# Patient Record
Sex: FEMALE | Race: WHITE | NOT HISPANIC OR LATINO | URBAN - METROPOLITAN AREA
[De-identification: names, ages, dates, MRNs, and addresses within clinical notes are randomized per-mention and may not be internally consistent; named-entity substitution may affect disease eponyms.]

---

## 2017-04-29 ENCOUNTER — OUTPATIENT (OUTPATIENT)
Dept: OUTPATIENT SERVICES | Facility: HOSPITAL | Age: 77
LOS: 1 days | Discharge: HOME | End: 2017-04-29

## 2017-06-28 DIAGNOSIS — Z79.899 OTHER LONG TERM (CURRENT) DRUG THERAPY: ICD-10-CM

## 2017-06-28 DIAGNOSIS — Z01.810 ENCOUNTER FOR PREPROCEDURAL CARDIOVASCULAR EXAMINATION: ICD-10-CM

## 2017-06-28 DIAGNOSIS — E78.00 PURE HYPERCHOLESTEROLEMIA, UNSPECIFIED: ICD-10-CM

## 2017-06-28 DIAGNOSIS — I25.10 ATHEROSCLEROTIC HEART DISEASE OF NATIVE CORONARY ARTERY WITHOUT ANGINA PECTORIS: ICD-10-CM

## 2017-10-07 ENCOUNTER — OUTPATIENT (OUTPATIENT)
Dept: OUTPATIENT SERVICES | Facility: HOSPITAL | Age: 77
LOS: 1 days | Discharge: HOME | End: 2017-10-07

## 2017-10-07 DIAGNOSIS — Z79.899 OTHER LONG TERM (CURRENT) DRUG THERAPY: ICD-10-CM

## 2017-10-07 DIAGNOSIS — Z01.810 ENCOUNTER FOR PREPROCEDURAL CARDIOVASCULAR EXAMINATION: ICD-10-CM

## 2017-10-07 DIAGNOSIS — E78.00 PURE HYPERCHOLESTEROLEMIA, UNSPECIFIED: ICD-10-CM

## 2017-10-07 DIAGNOSIS — I25.10 ATHEROSCLEROTIC HEART DISEASE OF NATIVE CORONARY ARTERY WITHOUT ANGINA PECTORIS: ICD-10-CM

## 2018-04-12 ENCOUNTER — OUTPATIENT (OUTPATIENT)
Dept: OUTPATIENT SERVICES | Facility: HOSPITAL | Age: 78
LOS: 1 days | Discharge: HOME | End: 2018-04-12

## 2018-04-12 DIAGNOSIS — Z79.899 OTHER LONG TERM (CURRENT) DRUG THERAPY: ICD-10-CM

## 2018-04-12 DIAGNOSIS — I25.10 ATHEROSCLEROTIC HEART DISEASE OF NATIVE CORONARY ARTERY WITHOUT ANGINA PECTORIS: ICD-10-CM

## 2018-04-12 DIAGNOSIS — E78.00 PURE HYPERCHOLESTEROLEMIA, UNSPECIFIED: ICD-10-CM

## 2018-04-12 DIAGNOSIS — Z01.810 ENCOUNTER FOR PREPROCEDURAL CARDIOVASCULAR EXAMINATION: ICD-10-CM

## 2018-08-06 ENCOUNTER — OUTPATIENT (OUTPATIENT)
Dept: OUTPATIENT SERVICES | Facility: HOSPITAL | Age: 78
LOS: 1 days | Discharge: HOME | End: 2018-08-06

## 2018-08-06 DIAGNOSIS — E53.8 DEFICIENCY OF OTHER SPECIFIED B GROUP VITAMINS: ICD-10-CM

## 2018-08-06 DIAGNOSIS — E83.40 DISORDERS OF MAGNESIUM METABOLISM, UNSPECIFIED: ICD-10-CM

## 2018-08-06 DIAGNOSIS — E11.9 TYPE 2 DIABETES MELLITUS WITHOUT COMPLICATIONS: ICD-10-CM

## 2018-08-06 DIAGNOSIS — A69.20 LYME DISEASE, UNSPECIFIED: ICD-10-CM

## 2018-08-06 DIAGNOSIS — E55.9 VITAMIN D DEFICIENCY, UNSPECIFIED: ICD-10-CM

## 2018-08-06 DIAGNOSIS — D50.9 IRON DEFICIENCY ANEMIA, UNSPECIFIED: ICD-10-CM

## 2018-08-06 DIAGNOSIS — N39.0 URINARY TRACT INFECTION, SITE NOT SPECIFIED: ICD-10-CM

## 2018-10-16 ENCOUNTER — OUTPATIENT (OUTPATIENT)
Dept: OUTPATIENT SERVICES | Facility: HOSPITAL | Age: 78
LOS: 1 days | Discharge: HOME | End: 2018-10-16

## 2018-10-16 DIAGNOSIS — Z01.810 ENCOUNTER FOR PREPROCEDURAL CARDIOVASCULAR EXAMINATION: ICD-10-CM

## 2018-10-16 DIAGNOSIS — I25.10 ATHEROSCLEROTIC HEART DISEASE OF NATIVE CORONARY ARTERY WITHOUT ANGINA PECTORIS: ICD-10-CM

## 2018-10-16 DIAGNOSIS — Z79.899 OTHER LONG TERM (CURRENT) DRUG THERAPY: ICD-10-CM

## 2018-10-16 DIAGNOSIS — E78.00 PURE HYPERCHOLESTEROLEMIA, UNSPECIFIED: ICD-10-CM

## 2019-03-07 ENCOUNTER — OUTPATIENT (OUTPATIENT)
Dept: OUTPATIENT SERVICES | Facility: HOSPITAL | Age: 79
LOS: 1 days | Discharge: HOME | End: 2019-03-07

## 2019-03-07 DIAGNOSIS — I25.10 ATHEROSCLEROTIC HEART DISEASE OF NATIVE CORONARY ARTERY WITHOUT ANGINA PECTORIS: ICD-10-CM

## 2019-03-07 DIAGNOSIS — Z01.810 ENCOUNTER FOR PREPROCEDURAL CARDIOVASCULAR EXAMINATION: ICD-10-CM

## 2019-03-07 DIAGNOSIS — E78.00 PURE HYPERCHOLESTEROLEMIA, UNSPECIFIED: ICD-10-CM

## 2019-03-07 DIAGNOSIS — Z79.899 OTHER LONG TERM (CURRENT) DRUG THERAPY: ICD-10-CM

## 2019-03-17 PROBLEM — Z00.00 ENCOUNTER FOR PREVENTIVE HEALTH EXAMINATION: Status: ACTIVE | Noted: 2019-03-17

## 2019-09-30 ENCOUNTER — APPOINTMENT (OUTPATIENT)
Dept: CARDIOLOGY | Facility: CLINIC | Age: 79
End: 2019-09-30
Payer: MEDICARE

## 2019-09-30 PROCEDURE — 93000 ELECTROCARDIOGRAM COMPLETE: CPT

## 2019-09-30 PROCEDURE — 99214 OFFICE O/P EST MOD 30 MIN: CPT

## 2019-10-10 ENCOUNTER — APPOINTMENT (OUTPATIENT)
Dept: CARDIOLOGY | Facility: CLINIC | Age: 79
End: 2019-10-10

## 2019-12-11 ENCOUNTER — INPATIENT (INPATIENT)
Facility: HOSPITAL | Age: 79
LOS: 5 days | Discharge: HOME | End: 2019-12-17
Attending: INTERNAL MEDICINE | Admitting: INTERNAL MEDICINE
Payer: MEDICARE

## 2019-12-11 VITALS
HEART RATE: 105 BPM | OXYGEN SATURATION: 94 % | RESPIRATION RATE: 18 BRPM | TEMPERATURE: 97 F | DIASTOLIC BLOOD PRESSURE: 67 MMHG | SYSTOLIC BLOOD PRESSURE: 143 MMHG

## 2019-12-11 LAB
ALBUMIN SERPL ELPH-MCNC: 4.3 G/DL — SIGNIFICANT CHANGE UP (ref 3.5–5.2)
ALP SERPL-CCNC: 83 U/L — SIGNIFICANT CHANGE UP (ref 30–115)
ALT FLD-CCNC: 22 U/L — SIGNIFICANT CHANGE UP (ref 0–41)
ANION GAP SERPL CALC-SCNC: 15 MMOL/L — HIGH (ref 7–14)
APPEARANCE UR: CLEAR — SIGNIFICANT CHANGE UP
APTT BLD: 32.8 SEC — SIGNIFICANT CHANGE UP (ref 27–39.2)
AST SERPL-CCNC: 17 U/L — SIGNIFICANT CHANGE UP (ref 0–41)
BASE EXCESS BLDV CALC-SCNC: 4.1 MMOL/L — HIGH (ref -2–2)
BASOPHILS # BLD AUTO: 0.04 K/UL — SIGNIFICANT CHANGE UP (ref 0–0.2)
BASOPHILS NFR BLD AUTO: 0.7 % — SIGNIFICANT CHANGE UP (ref 0–1)
BILIRUB SERPL-MCNC: 0.6 MG/DL — SIGNIFICANT CHANGE UP (ref 0.2–1.2)
BILIRUB UR-MCNC: NEGATIVE — SIGNIFICANT CHANGE UP
BLD GP AB SCN SERPL QL: SIGNIFICANT CHANGE UP
BUN SERPL-MCNC: 10 MG/DL — SIGNIFICANT CHANGE UP (ref 10–20)
CA-I SERPL-SCNC: 1.26 MMOL/L — SIGNIFICANT CHANGE UP (ref 1.12–1.3)
CALCIUM SERPL-MCNC: 9.8 MG/DL — SIGNIFICANT CHANGE UP (ref 8.5–10.1)
CHLORIDE SERPL-SCNC: 100 MMOL/L — SIGNIFICANT CHANGE UP (ref 98–110)
CO2 SERPL-SCNC: 24 MMOL/L — SIGNIFICANT CHANGE UP (ref 17–32)
COLOR SPEC: SIGNIFICANT CHANGE UP
CREAT SERPL-MCNC: 0.7 MG/DL — SIGNIFICANT CHANGE UP (ref 0.7–1.5)
DIFF PNL FLD: NEGATIVE — SIGNIFICANT CHANGE UP
EOSINOPHIL # BLD AUTO: 0.02 K/UL — SIGNIFICANT CHANGE UP (ref 0–0.7)
EOSINOPHIL NFR BLD AUTO: 0.3 % — SIGNIFICANT CHANGE UP (ref 0–8)
ETHANOL SERPL-MCNC: <10 MG/DL — SIGNIFICANT CHANGE UP
GAS PNL BLDV: 139 MMOL/L — SIGNIFICANT CHANGE UP (ref 136–145)
GAS PNL BLDV: SIGNIFICANT CHANGE UP
GLUCOSE SERPL-MCNC: 125 MG/DL — HIGH (ref 70–99)
GLUCOSE UR QL: NEGATIVE — SIGNIFICANT CHANGE UP
HCO3 BLDV-SCNC: 30 MMOL/L — HIGH (ref 22–29)
HCT VFR BLD CALC: 42.5 % — SIGNIFICANT CHANGE UP (ref 37–47)
HCT VFR BLDA CALC: 46.7 % — HIGH (ref 34–44)
HGB BLD CALC-MCNC: 15.3 G/DL — SIGNIFICANT CHANGE UP (ref 14–18)
HGB BLD-MCNC: 14.7 G/DL — SIGNIFICANT CHANGE UP (ref 12–16)
IMM GRANULOCYTES NFR BLD AUTO: 0.5 % — HIGH (ref 0.1–0.3)
INR BLD: 1 RATIO — SIGNIFICANT CHANGE UP (ref 0.65–1.3)
KETONES UR-MCNC: NEGATIVE — SIGNIFICANT CHANGE UP
LACTATE BLDV-MCNC: 1.1 MMOL/L — SIGNIFICANT CHANGE UP (ref 0.5–1.6)
LEUKOCYTE ESTERASE UR-ACNC: NEGATIVE — SIGNIFICANT CHANGE UP
LYMPHOCYTES # BLD AUTO: 1.47 K/UL — SIGNIFICANT CHANGE UP (ref 1.2–3.4)
LYMPHOCYTES # BLD AUTO: 25.4 % — SIGNIFICANT CHANGE UP (ref 20.5–51.1)
MCHC RBC-ENTMCNC: 31.5 PG — HIGH (ref 27–31)
MCHC RBC-ENTMCNC: 34.6 G/DL — SIGNIFICANT CHANGE UP (ref 32–37)
MCV RBC AUTO: 91 FL — SIGNIFICANT CHANGE UP (ref 81–99)
MONOCYTES # BLD AUTO: 0.67 K/UL — HIGH (ref 0.1–0.6)
MONOCYTES NFR BLD AUTO: 11.6 % — HIGH (ref 1.7–9.3)
NEUTROPHILS # BLD AUTO: 3.55 K/UL — SIGNIFICANT CHANGE UP (ref 1.4–6.5)
NEUTROPHILS NFR BLD AUTO: 61.5 % — SIGNIFICANT CHANGE UP (ref 42.2–75.2)
NITRITE UR-MCNC: NEGATIVE — SIGNIFICANT CHANGE UP
NRBC # BLD: 0 /100 WBCS — SIGNIFICANT CHANGE UP (ref 0–0)
PCO2 BLDV: 49 MMHG — SIGNIFICANT CHANGE UP (ref 41–51)
PH BLDV: 7.4 — SIGNIFICANT CHANGE UP (ref 7.26–7.43)
PH UR: 6 — SIGNIFICANT CHANGE UP (ref 5–8)
PLATELET # BLD AUTO: 245 K/UL — SIGNIFICANT CHANGE UP (ref 130–400)
PO2 BLDV: 20 MMHG — SIGNIFICANT CHANGE UP (ref 20–40)
POTASSIUM BLDV-SCNC: 4 MMOL/L — SIGNIFICANT CHANGE UP (ref 3.3–5.6)
POTASSIUM SERPL-MCNC: 4.2 MMOL/L — SIGNIFICANT CHANGE UP (ref 3.5–5)
POTASSIUM SERPL-SCNC: 4.2 MMOL/L — SIGNIFICANT CHANGE UP (ref 3.5–5)
PROT SERPL-MCNC: 7 G/DL — SIGNIFICANT CHANGE UP (ref 6–8)
PROT UR-MCNC: NEGATIVE — SIGNIFICANT CHANGE UP
PROTHROM AB SERPL-ACNC: 11.5 SEC — SIGNIFICANT CHANGE UP (ref 9.95–12.87)
RBC # BLD: 4.67 M/UL — SIGNIFICANT CHANGE UP (ref 4.2–5.4)
RBC # FLD: 11.5 % — SIGNIFICANT CHANGE UP (ref 11.5–14.5)
SAO2 % BLDV: 36 % — SIGNIFICANT CHANGE UP
SODIUM SERPL-SCNC: 139 MMOL/L — SIGNIFICANT CHANGE UP (ref 135–146)
SP GR SPEC: 1.04 — HIGH (ref 1.01–1.02)
TROPONIN T SERPL-MCNC: <0.01 NG/ML — SIGNIFICANT CHANGE UP
UROBILINOGEN FLD QL: SIGNIFICANT CHANGE UP
WBC # BLD: 5.78 K/UL — SIGNIFICANT CHANGE UP (ref 4.8–10.8)
WBC # FLD AUTO: 5.78 K/UL — SIGNIFICANT CHANGE UP (ref 4.8–10.8)

## 2019-12-11 PROCEDURE — 70450 CT HEAD/BRAIN W/O DYE: CPT | Mod: 26

## 2019-12-11 PROCEDURE — 0042T: CPT

## 2019-12-11 PROCEDURE — 99221 1ST HOSP IP/OBS SF/LOW 40: CPT

## 2019-12-11 PROCEDURE — 71045 X-RAY EXAM CHEST 1 VIEW: CPT | Mod: 26

## 2019-12-11 PROCEDURE — 99291 CRITICAL CARE FIRST HOUR: CPT

## 2019-12-11 PROCEDURE — 93010 ELECTROCARDIOGRAM REPORT: CPT

## 2019-12-11 RX ORDER — THIAMINE MONONITRATE (VIT B1) 100 MG
100 TABLET ORAL DAILY
Refills: 0 | Status: DISCONTINUED | OUTPATIENT
Start: 2019-12-11 | End: 2019-12-17

## 2019-12-11 RX ORDER — LOSARTAN POTASSIUM 100 MG/1
50 TABLET, FILM COATED ORAL DAILY
Refills: 0 | Status: DISCONTINUED | OUTPATIENT
Start: 2019-12-11 | End: 2019-12-17

## 2019-12-11 RX ORDER — SIMVASTATIN 20 MG/1
20 TABLET, FILM COATED ORAL AT BEDTIME
Refills: 0 | Status: DISCONTINUED | OUTPATIENT
Start: 2019-12-11 | End: 2019-12-11

## 2019-12-11 RX ORDER — METOPROLOL TARTRATE 50 MG
50 TABLET ORAL DAILY
Refills: 0 | Status: DISCONTINUED | OUTPATIENT
Start: 2019-12-11 | End: 2019-12-11

## 2019-12-11 RX ORDER — ASPIRIN/CALCIUM CARB/MAGNESIUM 324 MG
325 TABLET ORAL ONCE
Refills: 0 | Status: COMPLETED | OUTPATIENT
Start: 2019-12-11 | End: 2019-12-11

## 2019-12-11 RX ORDER — ATORVASTATIN CALCIUM 80 MG/1
40 TABLET, FILM COATED ORAL AT BEDTIME
Refills: 0 | Status: DISCONTINUED | OUTPATIENT
Start: 2019-12-11 | End: 2019-12-17

## 2019-12-11 RX ORDER — CHLORHEXIDINE GLUCONATE 213 G/1000ML
1 SOLUTION TOPICAL
Refills: 0 | Status: DISCONTINUED | OUTPATIENT
Start: 2019-12-11 | End: 2019-12-17

## 2019-12-11 RX ORDER — THIAMINE MONONITRATE (VIT B1) 100 MG
6 TABLET ORAL DAILY
Refills: 0 | Status: DISCONTINUED | OUTPATIENT
Start: 2019-12-11 | End: 2019-12-11

## 2019-12-11 RX ORDER — ASPIRIN/CALCIUM CARB/MAGNESIUM 324 MG
81 TABLET ORAL DAILY
Refills: 0 | Status: DISCONTINUED | OUTPATIENT
Start: 2019-12-11 | End: 2019-12-11

## 2019-12-11 RX ORDER — ENOXAPARIN SODIUM 100 MG/ML
40 INJECTION SUBCUTANEOUS DAILY
Refills: 0 | Status: DISCONTINUED | OUTPATIENT
Start: 2019-12-11 | End: 2019-12-17

## 2019-12-11 RX ORDER — ASPIRIN/CALCIUM CARB/MAGNESIUM 324 MG
162 TABLET ORAL DAILY
Refills: 0 | Status: DISCONTINUED | OUTPATIENT
Start: 2019-12-11 | End: 2019-12-17

## 2019-12-11 RX ORDER — SODIUM CHLORIDE 9 MG/ML
1000 INJECTION INTRAMUSCULAR; INTRAVENOUS; SUBCUTANEOUS ONCE
Refills: 0 | Status: COMPLETED | OUTPATIENT
Start: 2019-12-11 | End: 2019-12-11

## 2019-12-11 RX ORDER — FOLIC ACID 0.8 MG
1 TABLET ORAL DAILY
Refills: 0 | Status: DISCONTINUED | OUTPATIENT
Start: 2019-12-11 | End: 2019-12-17

## 2019-12-11 RX ORDER — METOPROLOL TARTRATE 50 MG
50 TABLET ORAL DAILY
Refills: 0 | Status: DISCONTINUED | OUTPATIENT
Start: 2019-12-11 | End: 2019-12-17

## 2019-12-11 RX ADMIN — SODIUM CHLORIDE 1000 MILLILITER(S): 9 INJECTION INTRAMUSCULAR; INTRAVENOUS; SUBCUTANEOUS at 12:26

## 2019-12-11 RX ADMIN — Medication 1 MILLIGRAM(S): at 21:49

## 2019-12-11 RX ADMIN — Medication 1 MILLIGRAM(S): at 21:00

## 2019-12-11 NOTE — H&P ADULT - NSHPSOCIALHISTORY_GEN_ALL_CORE
Former smoker (quit 35 years ago), 25 pack years  Current drinker: About 2 12oz cans of beer/day   Never drugs  Not sexually active  Retired, lives alone  Golfs/plays tennis about 2-4 times per week

## 2019-12-11 NOTE — CONSULT NOTE ADULT - ASSESSMENT
Impression:  79 year old woman with a PMH of HTN DLD and prior transient global amnesia, otherwise healthy presenting with AMS. Per family, last normal last night approx 830pm the previous evening, this morning was found to be altered, having difficulty with word finding. She is known to have a recent URI. Stroke code called on arrival to ED. CTH failed to reveal acute intracranial pathology. CTA CTP failed to reveal an LVO or significant perfusion abnormality.  Etiology of symptoms unknown will have a better understanding post stroke workup.     Suggestion:  Routine stroke workup including:  MRI brain without óscar.  TTE.  LDL, A1C  Telemetry monitoring.   PT OT Rehab  Infectious workup.  Medical workup for encephalopathy.  Routine EEG.     Would start ASA and statin.    Disposition:  Stroke unit    Miles Lauren NP  [id number]

## 2019-12-11 NOTE — ED PROVIDER NOTE - NS ED ROS FT
Limited ROS 2/2 patient's presenting illness. Patient unable to complete full sentences. When asked, patient denies chest pain, SOB, abdominal pain, back pain. Otherwise ROS limited. Limited ROS 2/2 patient's presenting symptoms. Patient unable to complete full sentences.   CV: Denies chest pain, palpitations  PULM: Denies SOB, cough  GI: Denies nausea, vomiting, diarrhea  NEURO: Denies headache

## 2019-12-11 NOTE — ED ADULT NURSE NOTE - OBJECTIVE STATEMENT
Patient arrived from home via EMS after family stated they noted her to have AMS. Normally patient is alert and able to have a conversation but patient is currently slow to respond. Last known well was 8PM last night .

## 2019-12-11 NOTE — CONSULT NOTE ADULT - ATTENDING COMMENTS
Patient seen and examined with NP during stroke code.  Patient was last spoken to normal at 8PM the night before.  She had CTH which was unremarkable and CTP which was unremarkable.  Daughter says she has gotten like this with UTI's in past.    Plan as above

## 2019-12-11 NOTE — ED PROVIDER NOTE - CLINICAL SUMMARY MEDICAL DECISION MAKING FREE TEXT BOX
78yo F history of prior transient global amnesia, otherwise healthy presenting with AMS. Per family, last normal last night approx 830pm, this morning was found to be altered, not following commands, having difficulty with word finding. +recent URI sx, no other complaints. stroke code called. labs ekg imaging reviewed. dw neuro 78yo F history of prior transient global amnesia, otherwise healthy presenting with AMS. Per family, last normal last night approx 830pm, this morning was found to be altered, not following commands, having difficulty with word finding. +recent URI sx, no other complaints. stroke code called. labs ekg imaging reviewed. grace neuro, will admit

## 2019-12-11 NOTE — H&P ADULT - ATTENDING COMMENTS
Patient seen and evaluated in the ED. Agree with the resident as above except as noted in this section.    Encephalopathy with expressive aphasia   r/o CVA     care as per stroke unit  Neurochecks q4 hours  Monitor BP   f/u REEG  Repeat CTH   will need MRI   IVF  ECHO/bubble study   PT/rehab   SLP   DVT ppx   Decub prevention as per RN protocol

## 2019-12-11 NOTE — ED PROVIDER NOTE - OBJECTIVE STATEMENT
79 year old female, pmh brief global amnesia in past, who presents with altered mental status. Family spoke with patient last night at 8pm, patient was at her baseline, speaking full sentences on phone, at that time patient stated she "didn't feel well." Family did not hear from patient this morning, went to her home where she was found standing in her kitchen not responding to external stimuli. Patient recently with flu-like symptoms last week. 79 year old female, pmh brief global amnesia in past, HTN, HDL, who presents with altered mental status. Family spoke with patient last night at 8pm, patient was at her baseline, speaking full sentences on phone, at that time patient stated she "didn't feel well." Family did not hear from patient this morning, went to her home where she was found standing in her kitchen not responding to external stimuli. Patient recently with flu-like symptoms last week. No fever. 79 year old female, pmh HTN, HDL, brief global amnesia in past, who presents with altered mental status. Family spoke with patient last night at 8pm, states she was at her baseline, speaking full sentences on phone, at that time patient stated she "didn't feel well." Family did not hear from patient this morning, went to her home where she was found standing in her kitchen not responding to external stimuli, unknown if patient had fall, trauma. Patient recently with flu-like symptoms last week. No fever, cough, vomiting, diarrhea.

## 2019-12-11 NOTE — ED ADULT NURSE REASSESSMENT NOTE - NS ED NURSE REASSESS COMMENT FT1
PT went to MRI, tech called and stated that pt was increasingly agitated and will not be able to perform the test. MD [phone] notified and ativan 1mg was order and given. PT remained agitated and test was unable to be performed.

## 2019-12-11 NOTE — CHART NOTE - NSCHARTNOTEFT_GEN_A_CORE
Patient was taken for MRI. Patient was unable to remain still for MRI, was deemed unsafe. Stat dose of IV ativan was ordered, did not improve patient status. Will re-assess patient for MRI

## 2019-12-11 NOTE — H&P ADULT - NSHPLABSRESULTS_GEN_ALL_CORE
14.7   5.78  )-----------( 245      ( 11 Dec 2019 11:07 )             42.5         139  |  100  |  10  ----------------------------<  125<H>  4.2   |  24  |  0.7    Ca    9.8      11 Dec 2019 11:07    TPro  7.0  /  Alb  4.3  /  TBili  0.6  /  DBili  x   /  AST  17  /  ALT  22  /  AlkPhos  83  12-    Urinalysis Basic - ( 11 Dec 2019 11:07 )    Color: Light Yellow / Appearance: Clear / S.042 / pH: x  Gluc: x / Ketone: Negative  / Bili: Negative / Urobili: <2 mg/dL   Blood: x / Protein: Negative / Nitrite: Negative   Leuk Esterase: Negative / RBC: x / WBC x   Sq Epi: x / Non Sq Epi: x / Bacteria: x      PT/INR - ( 11 Dec 2019 11:07 )   PT: 11.50 sec;   INR: 1.00 ratio         PTT - ( 11 Dec 2019 11:07 )  PTT:32.8 sec    < from: CT Brain Stroke Protocol (19 @ 11:30) >    IMPRESSION:     1.  No evidence of acute intracranial hemorrhage or large territory   infarct.    2.  Moderate chronic microvascular changes.    < end of copied text >    < from: Xray Chest 1 View- PORTABLE-Urgent (19 @ 13:08) >      Impression:      No radiographic evidence of acute cardiopulmonary disease.    < end of copied text >    < from: CT Perfusion w/ Maps w/ IV Cont (19 @ 13:10) >      IMPRESSION:    1.  No evidence of large vessel occlusion. No definite perfusion   abnormality.    2.  Calcific plaque at the right ICA origin with about 50% stenosis.    3.  Motion artifact somewhat limits evaluation of the intracranial   circulation.    4.  Bilateral palatine tonsillar prominence, correlate clinically for   tonsillitis.      < end of copied text >

## 2019-12-11 NOTE — ED PROVIDER NOTE - ATTENDING CONTRIBUTION TO CARE
78yo F history of prior transient global amnesia, otherwise healthy presenting with AMS. Per family, last normal last night approx 830pm, this morning was found to be altered, not following commands, having difficulty with word finding. +recent URI sx, no other complaints.   Constitutional:  Non toxic.   Eyes: PERRLA. Extraocular movements intact, no entrapment. Conjunctiva normal.   ENT: No nasal discharge. Moist mucus membranes.  Neck: Supple, non tender, full range of motion.  CV: RRR no murmurs, rubs, or gallops. +S1S2.   Pulm: Clear to auscultation bilaterally. Normal work of breathing.  Abd: soft NT ND +BS.   Ext: Warm and well perfused x4, moving all extremities, no edema.   Psy: Cooperative, appropriate.   Skin: Warm, dry, no rash  Neuro: unable to follow commands

## 2019-12-11 NOTE — H&P ADULT - NSHPPHYSICALEXAM_GEN_ALL_CORE
PHYSICAL EXAM:  GENERAL: Tremulous, lying in bed  HEAD:  Atraumatic, Normocephalic  EYES: EOMI, PERRLA, conjunctiva and sclera clear  ENT: Moist mucous membranes  NECK: Supple, No JVD  CHEST/LUNG: Clear to auscultation bilaterally; No rales, rhonchi, wheezing, or rubs. Unlabored respirations  HEART: Regular rate and rhythm; No murmurs, rubs, or gallops  ABDOMEN: Bowel sounds present; Soft, Nontender, Nondistended. No hepatomegaly  EXTREMITIES:  2+ Peripheral Pulses, brisk capillary refill. No clubbing, cyanosis, or edema  NERVOUS SYSTEM: Patient has tremors; Alert & Oriented X1 (knows family member's names); expressive aphasia, dysarthric; Unable to assess cranial nerves, as patient is unable to follow commands; responsive to pain in b/l upper and lower extremities; able to keep both arms and legs up for around 5 seconds   MSK: FROM all 4 extremities, 3/5 upper and lower extremities b/l  SKIN: No rashes or lesions

## 2019-12-11 NOTE — H&P ADULT - ASSESSMENT
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 Stroke code was called, NIHSS 7, no TPA given, imaging so far negative.     #Possible stroke- patient with altered mental status, NIHSS 7, CT head/perfusion studies negative so far  -admit to stroke unit  -MRI brain without óscar.  -TTE.  -LDL, A1C  -Telemetry monitoring.   -PT OT Rehab  -Routine EEG.     #HTN  -Losartan and Metoprolol tartrate    #HLD  -Simvastatin    #Possible thiamine and folate deficiency  - Supplement    #Code  - Full    #Diet  - NPO pending speech/swallow eval    #DVT PPx  - SCD    #Dispo  - From home, lives by herself, possible need social eval 79 Y F with pmh of transient global ischemia (8-9 years ago), HTN, DLD presents to ED after being found by sister and daughter this morning with altered mental status. Stroke code was called, NIHSS 7, no TPA given, imaging so far negative.     #Possible stroke- patient with altered mental status, NIHSS 7, CT head/perfusion studies negative so far  -admit to stroke unit  -MRI brain without óscar.  -TTE.  -LDL, A1C  -Telemetry monitoring.   -PT OT Rehab  -Routine EEG.   - asa and statin    #HTN  -Losartan and Metoprolol tartrate      #Possible thiamine and folate deficiency  - due to prolonged EtOH use  - Supplement    #Code  - Full    #Diet  - NPO pending speech/swallow eval    #DVT PPx  - SCD    #Dispo  - From home, lives by herself, possible need social eval

## 2019-12-11 NOTE — H&P ADULT - HISTORY OF PRESENT ILLNESS
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 Per family, patient was complaining of "not feeling right" the last week with URI symptoms (cough, fever) and went to PMD earlier this week, was sent home with presumed diagnosis of flu (not given any Tamiflu). She was saying that she was so tired throughout the week, had lost around 8 lbs this week, and last night patient told her daughter that she was "feeling so tired" before she went to bed around 8:30PM. Her family went to her house around 9AM and found patient standing in kitchen, confused, not able to answer questions, couldn't find words. Per her daughter, a similar episode happened around 8 or 9 years ago where she was confused and not answering questions, went to ED and was diagnosed with dehydration and UTI. Per daughter, patient spends a lot of time golfing near woods.   ED Course: Stroke code was called, NIHSS was 7 but no TPA given due to patient being outside of window period. CTH failed to reveal acute intracranial pathology. CTA CTP failed to reveal an LVO or significant perfusion abnormality. Vitals signs T 97 F, /67, P 105, R 18, S 94% RA.

## 2019-12-11 NOTE — CONSULT NOTE ADULT - SUBJECTIVE AND OBJECTIVE BOX
MARTIR POWELL    Chief Complaint: Aphasia    HPI:  79 year old woman with a PMH of HTN DLD and prior transient global amnesia, otherwise healthy presenting with AMS. Per family, last normal last night approx 830pm the previous evening, this morning was found to be altered, having difficulty with word finding. She is known to have a recent URI. Stroke code called on arrival to ED. CTH failed to reveal acute intracranial pathology. CTA CTP failed to reveal an LVO or significant perfusion abnormality.      Relevant PMH:  [] Prior ischemic stroke/TIA  [] Afib  []CAD  [x]HTN  [x]DLD  []DM []PVD []Obesity [] Sedintary lifestyle []CHF  []LYNNE  []Cancer Hx     Social History: [] Smoking []  Drug Use: []   Alcohol Use:   [] Other:      Possible Location of Stroke:  Unknown at this time, will have a better understanding post stroke workup.  Possible Cause of Stroke:  Unknown at this time, will have a better understanding post stroke workup.  Relevant Cerebral Imaging:  < from: CT Brain Stroke Protocol (12.11.19 @ 11:30) >    IMPRESSION:     1.  No evidence of acute intracranial hemorrhage or large territory   infarct.    2.  Moderate chronic microvascular changes.    Relevant Cervicocerebral Imaging:    Pending      Relevant blood tests:    pending  Relevant cardiac rhythm monitoring:  pending  Relevant Cardiac Structure:(TTE/CIRA +/-):[]No intracardiac thrombus/[] no vegetation/[]no akynesia/EF:  pending    Home Medications:  aspirin 81 mg oral tablet, chewable:  (11 Dec 2019 11:29)  folic acid:  (11 Dec 2019 11:29)  irbesartan 150 mg oral tablet:  (11 Dec 2019 11:29)  Metoprolol Tartrate 50 mg oral tablet:  (11 Dec 2019 11:29)  pravastatin 40 mg oral tablet:  (11 Dec 2019 11:29)      MEDICATIONS  (STANDING):      PT/OT/Speech/Rehab/S&Swr: pending    Exam:    Vital Signs Last 24 Hrs  T(C): 35.7 (11 Dec 2019 11:50), Max: 36.1 (11 Dec 2019 10:57)  T(F): 96.3 (11 Dec 2019 11:50), Max: 97 (11 Dec 2019 10:57)  HR: 97 (11 Dec 2019 11:50) (97 - 105)  BP: 131/66 (11 Dec 2019 11:50) (131/66 - 143/67)  BP(mean): --  RR: 18 (11 Dec 2019 10:57) (18 - 18)  SpO2: 94% (11 Dec 2019 10:57) (94% - 94%)    NIHSS      LOC:       1a:  0   1b(Questions): 2          1c(Instructions):2             Best Gaze:0  Visual:0  Motor:                 RUE:  0   RLE:  0   LUE:   0  LLE:0     FACE:  0   Limb Ataxia:0  Sensory:     0  Language:    2   Dysarthria:     1     Extinction and Inattention:0       NIHSS today:  7           m-RS:3

## 2019-12-11 NOTE — ED ADULT NURSE NOTE - PMH
High cholesterol    HTN (hypertension) High cholesterol    HTN (hypertension)    Transient global amnesia

## 2019-12-11 NOTE — ED PROVIDER NOTE - PHYSICAL EXAMINATION
CONST: Well appearing in NAD  EYES: PERRL, EOMI, Sclera and conjunctiva clear.   ENT:  Oropharynx normal appearing, no erythema or exudates. No abscess or swelling. Uvula midline.   NECK: Non-tender, normal ROM  CARD: Normal S1 S2; Normal rate and rhythm  RESP: Equal BS B/L, No wheezes, rhonchi or rales. No distress  GI: Soft, non-tender, non-distended.   MS: Normal ROM in all extremities. No midline spinal tenderness.   SKIN: Warm, dry, no acute rashes. Good turgor  NEURO: A&OX3, intermittent eye tracking. patient not following commands. CONST: Well appearing in NAD  EYES: PERRL, EOMI, Sclera and conjunctiva clear.   ENT:  Oropharynx normal appearing, no erythema or exudates. No abscess or swelling. Uvula midline. No periorbital or mastoid tenderness or ecchymosis. No evidence of basal skull fx. No hemotympanum. No nasal discharge.  NECK: Non-tender, normal ROM  CARD: Normal S1 S2; Normal rate and rhythm  RESP: Equal BS B/L, No wheezes, rhonchi or rales. No respiratory distress.  GI: Soft, non-tender, non-distended.   MS: Normal ROM in all extremities. No midline spinal tenderness. Pulses 2+ bilaterally.  SKIN: Warm, dry, intact. No evidence of rash.  NEURO: A&O1, patient with eyes open, intermittent eye tracking, does not respond to questions and does not follow commands. CN II-XII unable to be assessed.

## 2019-12-12 LAB
ALBUMIN SERPL ELPH-MCNC: 4.2 G/DL — SIGNIFICANT CHANGE UP (ref 3.5–5.2)
ALP SERPL-CCNC: 84 U/L — SIGNIFICANT CHANGE UP (ref 30–115)
ALT FLD-CCNC: 20 U/L — SIGNIFICANT CHANGE UP (ref 0–41)
AMMONIA BLD-MCNC: 16 UMOL/L — SIGNIFICANT CHANGE UP (ref 11–55)
ANION GAP SERPL CALC-SCNC: 16 MMOL/L — HIGH (ref 7–14)
AST SERPL-CCNC: 20 U/L — SIGNIFICANT CHANGE UP (ref 0–41)
BASOPHILS # BLD AUTO: 0.05 K/UL — SIGNIFICANT CHANGE UP (ref 0–0.2)
BASOPHILS NFR BLD AUTO: 0.7 % — SIGNIFICANT CHANGE UP (ref 0–1)
BILIRUB SERPL-MCNC: 0.6 MG/DL — SIGNIFICANT CHANGE UP (ref 0.2–1.2)
BUN SERPL-MCNC: 11 MG/DL — SIGNIFICANT CHANGE UP (ref 10–20)
CALCIUM SERPL-MCNC: 9.5 MG/DL — SIGNIFICANT CHANGE UP (ref 8.5–10.1)
CHLORIDE SERPL-SCNC: 102 MMOL/L — SIGNIFICANT CHANGE UP (ref 98–110)
CHOLEST SERPL-MCNC: 166 MG/DL — SIGNIFICANT CHANGE UP (ref 100–200)
CO2 SERPL-SCNC: 23 MMOL/L — SIGNIFICANT CHANGE UP (ref 17–32)
CREAT SERPL-MCNC: 0.5 MG/DL — LOW (ref 0.7–1.5)
EOSINOPHIL # BLD AUTO: 0.02 K/UL — SIGNIFICANT CHANGE UP (ref 0–0.7)
EOSINOPHIL NFR BLD AUTO: 0.3 % — SIGNIFICANT CHANGE UP (ref 0–8)
ESTIMATED AVERAGE GLUCOSE: 111 MG/DL — SIGNIFICANT CHANGE UP (ref 68–114)
GLUCOSE SERPL-MCNC: 118 MG/DL — HIGH (ref 70–99)
HBA1C BLD-MCNC: 5.5 % — SIGNIFICANT CHANGE UP (ref 4–5.6)
HCT VFR BLD CALC: 42 % — SIGNIFICANT CHANGE UP (ref 37–47)
HDLC SERPL-MCNC: 68 MG/DL — SIGNIFICANT CHANGE UP
HGB BLD-MCNC: 14.2 G/DL — SIGNIFICANT CHANGE UP (ref 12–16)
IMM GRANULOCYTES NFR BLD AUTO: 0.5 % — HIGH (ref 0.1–0.3)
LIPID PNL WITH DIRECT LDL SERPL: 89 MG/DL — SIGNIFICANT CHANGE UP (ref 4–129)
LYMPHOCYTES # BLD AUTO: 1.6 K/UL — SIGNIFICANT CHANGE UP (ref 1.2–3.4)
LYMPHOCYTES # BLD AUTO: 20.9 % — SIGNIFICANT CHANGE UP (ref 20.5–51.1)
MAGNESIUM SERPL-MCNC: 1.8 MG/DL — SIGNIFICANT CHANGE UP (ref 1.8–2.4)
MCHC RBC-ENTMCNC: 30.8 PG — SIGNIFICANT CHANGE UP (ref 27–31)
MCHC RBC-ENTMCNC: 33.8 G/DL — SIGNIFICANT CHANGE UP (ref 32–37)
MCV RBC AUTO: 91.1 FL — SIGNIFICANT CHANGE UP (ref 81–99)
MONOCYTES # BLD AUTO: 0.87 K/UL — HIGH (ref 0.1–0.6)
MONOCYTES NFR BLD AUTO: 11.3 % — HIGH (ref 1.7–9.3)
NEUTROPHILS # BLD AUTO: 5.09 K/UL — SIGNIFICANT CHANGE UP (ref 1.4–6.5)
NEUTROPHILS NFR BLD AUTO: 66.3 % — SIGNIFICANT CHANGE UP (ref 42.2–75.2)
NRBC # BLD: 0 /100 WBCS — SIGNIFICANT CHANGE UP (ref 0–0)
PHOSPHATE SERPL-MCNC: 3 MG/DL — SIGNIFICANT CHANGE UP (ref 2.1–4.9)
PLATELET # BLD AUTO: 271 K/UL — SIGNIFICANT CHANGE UP (ref 130–400)
POTASSIUM SERPL-MCNC: 4 MMOL/L — SIGNIFICANT CHANGE UP (ref 3.5–5)
POTASSIUM SERPL-SCNC: 4 MMOL/L — SIGNIFICANT CHANGE UP (ref 3.5–5)
PROT SERPL-MCNC: 7 G/DL — SIGNIFICANT CHANGE UP (ref 6–8)
RBC # BLD: 4.61 M/UL — SIGNIFICANT CHANGE UP (ref 4.2–5.4)
RBC # FLD: 11.4 % — LOW (ref 11.5–14.5)
SODIUM SERPL-SCNC: 141 MMOL/L — SIGNIFICANT CHANGE UP (ref 135–146)
TOTAL CHOLESTEROL/HDL RATIO MEASUREMENT: 2.4 RATIO — LOW (ref 4–5.5)
TRIGL SERPL-MCNC: 75 MG/DL — SIGNIFICANT CHANGE UP (ref 10–149)
WBC # BLD: 7.67 K/UL — SIGNIFICANT CHANGE UP (ref 4.8–10.8)
WBC # FLD AUTO: 7.67 K/UL — SIGNIFICANT CHANGE UP (ref 4.8–10.8)

## 2019-12-12 PROCEDURE — 93306 TTE W/DOPPLER COMPLETE: CPT | Mod: 26

## 2019-12-12 PROCEDURE — 99222 1ST HOSP IP/OBS MODERATE 55: CPT

## 2019-12-12 PROCEDURE — 70450 CT HEAD/BRAIN W/O DYE: CPT | Mod: 26

## 2019-12-12 RX ORDER — METOPROLOL TARTRATE 50 MG
5 TABLET ORAL ONCE
Refills: 0 | Status: COMPLETED | OUTPATIENT
Start: 2019-12-12 | End: 2019-12-12

## 2019-12-12 RX ADMIN — Medication 1 MILLIGRAM(S): at 13:19

## 2019-12-12 RX ADMIN — Medication 5 MILLIGRAM(S): at 07:06

## 2019-12-12 RX ADMIN — ATORVASTATIN CALCIUM 40 MILLIGRAM(S): 80 TABLET, FILM COATED ORAL at 23:17

## 2019-12-12 RX ADMIN — Medication 100 MILLIGRAM(S): at 13:20

## 2019-12-12 RX ADMIN — Medication 162 MILLIGRAM(S): at 13:19

## 2019-12-12 RX ADMIN — ENOXAPARIN SODIUM 40 MILLIGRAM(S): 100 INJECTION SUBCUTANEOUS at 13:20

## 2019-12-12 NOTE — SWALLOW BEDSIDE ASSESSMENT ADULT - SWALLOW EVAL: DIAGNOSIS
+toleration for thin liquids, puree and soft solids w/o overt s/s penetration/aspiration. mod oral dysphagia for hard solids w/o overt s/s penetration/aspiration.

## 2019-12-12 NOTE — SWALLOW BEDSIDE ASSESSMENT ADULT - SWALLOW EVAL: FUNCTIONAL LEVEL AT TIME OF EVAL
alert, awake, confused and nervous/trying to get out of bed, at times. required encouragement to take breaths and relax- pt responsive.

## 2019-12-12 NOTE — SWALLOW BEDSIDE ASSESSMENT ADULT - SLP GENERAL OBSERVATIONS
Pt received laying in bed, repositioned upright for evaluation. +room air. Pt alert and oriented to self only, difficulties noted w/ expressive language- further speech language assessment warranted. Pt's son and daughter at the bedside. No dysarthria observed.

## 2019-12-12 NOTE — PROGRESS NOTE ADULT - ASSESSMENT
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 Stroke code was called, NIHSS 7, no TPA given, imaging so far negative.     # AMS - NIHSS 7. Rule out stroke vs. wernicke encephalopathy vs. other  - CT head/perfusion studies negative so far  - admit to stroke unit  - f/u MRI brain without óscar. (unable to perform because patient was agitated. Re-ordered)  - f/u TTE.  - f/u LDL, A1C  - PT OT Rehab once patient is more stable  - f/u Routine EEG.   - Continue asa and statin  - Neuro f/u    # HTN  - Losartan and Metoprolol tartrate    # Possible thiamine and folate deficiency secondary to EtOH abuse  - CIWA protocol PRN  - due to prolonged EtOH use  - Supplement    Diet - NPO pending speech/swallow eval  DVT PPx - SCD  GI ppx - not indicated  Dispo - From home, lives by herself, possible need social eval   FULL CODE

## 2019-12-12 NOTE — PROGRESS NOTE ADULT - SUBJECTIVE AND OBJECTIVE BOX
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 Per family, patient was complaining of "not feeling right" the last week with URI symptoms (cough, fever) and went to PMD earlier this week, was sent home with presumed diagnosis of flu (not given any Tamiflu). She was saying that she was so tired throughout the week, had lost around 8 lbs this week, and last night patient told her daughter that she was "feeling so tired" before she went to bed around 8:30PM. Her family went to her house around 9AM and found patient standing in kitchen, confused, not able to answer questions, couldn't find words. Per her daughter, a similar episode happened around 8 or 9 years ago where she was confused and not answering questions, went to ED and was diagnosed with dehydration and UTI. Per daughter, patient spends a lot of time golfing near woods.   ED Course: Stroke code was called, NIHSS was 7 but no TPA given due to patient being outside of window period. CTH failed to reveal acute intracranial pathology. CTA CTP failed to reveal an LVO or significant perfusion abnormality. Vitals signs T 97 F, /67, P 105, R 18, S 94% RA. (11 Dec 2019 13:48)      Interval History - Patient seen this morning and she appears to be calmer then yesterday but still has difficulty expressing herself.  No events overnight          Vital Signs Last 24 Hrs  T(C): 36.4 (12 Dec 2019 08:20), Max: 37.3 (11 Dec 2019 17:05)  T(F): 97.6 (12 Dec 2019 08:20), Max: 99.1 (11 Dec 2019 17:05)  HR: 87 (12 Dec 2019 08:20) (87 - 105)  BP: 177/85 (12 Dec 2019 08:20) (131/66 - 177/85)  BP(mean): --  RR: 18 (12 Dec 2019 08:20) (18 - 20)  SpO2: 98% (12 Dec 2019 08:20) (94% - 100%)  ICU Vital Signs Last 24 Hrs  T(C): 36.4 (12 Dec 2019 08:20), Max: 37.3 (11 Dec 2019 17:05)  T(F): 97.6 (12 Dec 2019 08:20), Max: 99.1 (11 Dec 2019 17:05)  HR: 87 (12 Dec 2019 08:20) (87 - 105)  BP: 177/85 (12 Dec 2019 08:20) (131/66 - 177/85)  BP(mean): --  ABP: --  ABP(mean): --  RR: 18 (12 Dec 2019 08:20) (18 - 20)  SpO2: 98% (12 Dec 2019 08:20) (94% - 100%)          Neurological Exam:   alert oriented to person but has difficulty speaking.  Was able to read Mama but not any other words.  Could not repeat.  Was able to follow simple commands.  No facial and VFF to threat  No drift in UE and LE  FTN normal    NIHSS 3    Medications  aspirin enteric coated 162 milliGRAM(s) Oral daily  atorvastatin 40 milliGRAM(s) Oral at bedtime  chlorhexidine 4% Liquid 1 Application(s) Topical <User Schedule>  enoxaparin Injectable 40 milliGRAM(s) SubCutaneous daily  folic acid 1 milliGRAM(s) Oral daily  LORazepam   Injectable 2 milliGRAM(s) IV Push once  LORazepam   Injectable 1 milliGRAM(s) IV Push every 2 hours PRN  losartan 50 milliGRAM(s) Oral daily  metoprolol succinate ER 50 milliGRAM(s) Oral daily  thiamine 100 milliGRAM(s) Oral daily      Lab                        14.2   7.67  )-----------( 271      ( 12 Dec 2019 07:46 )             42.0     12-12    141  |  102  |  11  ----------------------------<  118<H>  4.0   |  23  |  0.5<L>    Ca    9.5      12 Dec 2019 07:46  Phos  3.0     12-12  Mg     1.8     12-12    TPro  7.0  /  Alb  4.2  /  TBili  0.6  /  DBili  x   /  AST  20  /  ALT  20  /  AlkPhos  84  12-12    CAPILLARY BLOOD GLUCOSE  125 (11 Dec 2019 12:47)      POCT Blood Glucose.: 108 mg/dL (11 Dec 2019 11:08)    LIVER FUNCTIONS - ( 12 Dec 2019 07:46 )  Alb: 4.2 g/dL / Pro: 7.0 g/dL / ALK PHOS: 84 U/L / ALT: 20 U/L / AST: 20 U/L / GGT: x           PT/INR - ( 11 Dec 2019 11:07 )   PT: 11.50 sec;   INR: 1.00 ratio         PTT - ( 11 Dec 2019 11:07 )  PTT:32.8 sec    Urinalysis (12.11.19 @ 11:07)    pH Urine: 6.0    Glucose Qualitative, Urine: Negative    Blood, Urine: Negative    Color: Light Yellow    Urine Appearance: Clear    Bilirubin: Negative    Ketone - Urine: Negative    Specific Gravity: 1.042    Protein, Urine: Negative    Urobilinogen: <2 mg/dL    Nitrite: Negative    Leukocyte Esterase Concentration: Negative        Radiology  < from: CT Perfusion w/ Maps w/ IV Cont (12.11.19 @ 13:10) >    EXAM:  CT PERFUSION W MAPS IC            PROCEDURE DATE:  12/11/2019            INTERPRETATION:  Clinical History / Reason for exam: Stroke code. Aphasia    Technique: CT angiogram of the head and neck including perfusion study of   the brain.  Contiguous CT axial images of the head and neck were obtained   following the bolus intravenous administration of 120 cc Optiray with   multiple 3-D and MIP reformats with perfusion mapping performed on a   separate 3D workstation ([employer]).    Correlation made with accompanying noncontrast head CT.    Findings:    CTA neck:   The visualized aortic arch and great vessel origins demonstrate calcific   plaque without significant stenosis. 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    The right common carotid artery is patent. There is calcific plaque at   the right ICA origin with about 50% stenosis. The ECA origin is patent.   Remaining cervical right ICA is patent.. Slightly medial course of the   right internal carotid artery indents the posterior pharyngeal wall.    The left common, internal and external carotid arteries are patent.    The vertebral arteries are patent.    CTA brain: There is motion artifact at the level of thehead. The distal   segments of the internal carotid arteries are grossly patent.  The   anterior and middle cerebral arteries are grossly patent.    The distal vertebral arteries are grossly patent. The basilar artery is   patent.  The PCAs are patent.     Perfusion: There is no definite perfusion abnormality. Apparent   abnormality in the inferior frontal regions is felt to be artifactual.    Other:   Mild cervical spine degenerative changes.  Mild maxillary sinus mucosal thickening.  Mild bilateral palatine tonsillar hyperenhancement and prominence. There   is mild prominence of the lingual tonsils.    IMPRESSION:    1.  No evidence of large vessel occlusion. No definite perfusion   abnormality.    2.  Calcific plaque at the right ICA origin with about 50% stenosis.    3.  Motion artifact somewhat limits evaluation of the intracranial   circulation.    4.  Bilateral palatine tonsillar prominence, correlate clinically for   tonsillitis.    < end of copied text >      < from: CT Brain Stroke Protocol (12.11.19 @ 11:30) >    IMPRESSION:     1.  No evidence of acute intracranial hemorrhage or large territory   infarct.    2.  Moderate chronic microvascular changes.    Spoke with EMIL WILEY on 12/11/2019 11:31 AM with readback.    < end of copied text >        Assessment- Ms. Powell presents with confusion highly suspicious for left temporal infarct with expressive aphasia    Plan  1. Admit to stroke unit  2. f/u REEG  3. Repeat CTH today but will need MRI brain when more calm  4. Keep -180  5. IVF  6. ECHO with bubble  7. PT/OT/Speech evaluations  8. Neurochecks q4 hours

## 2019-12-12 NOTE — PROGRESS NOTE ADULT - SUBJECTIVE AND OBJECTIVE BOX
SUBJECTIVE:    Patient is a 78 y/o Female who presents with a chief complaint of AMS (11 Dec 2019 13:48)    Currently admitted to medicine with the primary diagnosis of AMS (altered mental status), possibly secondary to stroke.      Today is hospital day 1. Patient was seen and examined at bedside. This morning she is confused and was placed on 1:1 sit for safety. Was not able to tolerate MRI. Was found to be in urinary retention and 1 L urine drained after straight cath performed by RN.     PAST MEDICAL & SURGICAL HISTORY  PAST MEDICAL & SURGICAL HISTORY:  Transient global amnesia  High cholesterol  HTN (hypertension)  No significant past surgical history    SOCIAL HISTORY:  Former smoker (quit 35 years ago), 25 pack years  Current drinker: About 2 12oz cans of beer/day   Never drugs  Not sexually active  Retired, lives alone  Golfs/plays tennis about 2-4 times per week    ALLERGIES:  No Known Drug Allergies  shellfish (Rash; Flushing; Drowsiness)    MEDICATIONS:  STANDING MEDICATIONS  aspirin enteric coated 162 milliGRAM(s) Oral daily  atorvastatin 40 milliGRAM(s) Oral at bedtime  chlorhexidine 4% Liquid 1 Application(s) Topical <User Schedule>  enoxaparin Injectable 40 milliGRAM(s) SubCutaneous daily  folic acid 1 milliGRAM(s) Oral daily  LORazepam   Injectable 2 milliGRAM(s) IV Push once  losartan 50 milliGRAM(s) Oral daily  metoprolol succinate ER 50 milliGRAM(s) Oral daily  thiamine 100 milliGRAM(s) Oral daily    PRN MEDICATIONS    VITALS:   T(F): 97.6  HR: 87  BP: 177/85  RR: 18  SpO2: 98%    LABS:                        14.2   7.67  )-----------( 271      ( 12 Dec 2019 07:46 )             42.0     12-12    141  |  102  |  11  ----------------------------<  118<H>  4.0   |  23  |  0.5<L>    Ca    9.5      12 Dec 2019 07:46  Phos  3.0     12-12  Mg     1.8     12-12    TPro  7.0  /  Alb  4.2  /  TBili  0.6  /  DBili  x   /  AST  20  /  ALT  20  /  AlkPhos  84  12-12    PT/INR - ( 11 Dec 2019 11:07 )   PT: 11.50 sec;   INR: 1.00 ratio         PTT - ( 11 Dec 2019 11:07 )  PTT:32.8 sec  Urinalysis Basic - ( 11 Dec 2019 11:07 )    Color: Light Yellow / Appearance: Clear / S.042 / pH: x  Gluc: x / Ketone: Negative  / Bili: Negative / Urobili: <2 mg/dL   Blood: x / Protein: Negative / Nitrite: Negative   Leuk Esterase: Negative / RBC: x / WBC x   Sq Epi: x / Non Sq Epi: x / Bacteria: x        Troponin T, Serum: <0.01 ng/mL (19 @ 11:07)      CARDIAC MARKERS ( 11 Dec 2019 11:07 )  x     / <0.01 ng/mL / x     / x     / x          RADIOLOGY:  < from: CT Perfusion w/ Maps w/ IV Cont (19 @ 13:10) >    IMPRESSION:    1.  No evidence of large vessel occlusion. No definite perfusion   abnormality.    2.  Calcific plaque at the right ICA origin with about 50% stenosis.    3.  Motion artifact somewhat limits evaluation of the intracranial   circulation.    4.  Bilateral palatine tonsillar prominence, correlate clinically for   tonsillitis.      < end of copied text >    < from: Xray Chest 1 View- PORTABLE-Urgent (19 @ 13:08) >    Impression:      No radiographic evidence of acute cardiopulmonary disease.        < end of copied text >    < from: CT Brain Stroke Protocol (19 @ 11:30) >    IMPRESSION:     1.  No evidence of acute intracranial hemorrhage or large territory   infarct.    2.  Moderate chronic microvascular changes.    Spoke with EMIL WILEY on 2019 11:31 AM with readback.        < end of copied text >        PHYSICAL EXAM:  GENERAL: Elderly F, very confused and agitated this morning, no signs of respiratory distress  HEAD: Atraumatic  NECK: Supple  CHEST/LUNG: Clear to auscultation bilaterally; No wheeze or crackles  HEART: S1, S2; RRR; No murmurs, rubs, or gallops  ABDOMEN: BS+; Soft, Non-tender, Non-distended  EXTREMITIES:  2+ Peripheral Pulses, No clubbing, cyanosis, or edema  NEUROLOGY:     MENTAL STATUS: AAOx1 (knows name only)    LANG/SPEECH: Unable to perform repetition & comprehension secondary to confusion    CRANIAL NERVES:    II: Pupils equal and reactive, no RAPD, normal visual field and fundus    III, IV, VI: EOM intact, no gaze preference or deviation    V: normal    VII: no facial asymmetry    VIII: unable to assess hearing    MOTOR: moves all extremities spontaneously    COORD: unable to assess coordination  SKIN: No rashes or lesions

## 2019-12-12 NOTE — SWALLOW BEDSIDE ASSESSMENT ADULT - SLP PERTINENT HISTORY OF CURRENT PROBLEM
Pt presented to ED w/ AMS, stroke code upon arrival to ED. No TPA presented, pt outside of window. 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 CXR (-), CT Brain (-), unable to complete MRI d/t pt unable to sit still. EEG completed, awaiting results. awaitn results of repeat CT or MRI.

## 2019-12-13 ENCOUNTER — TRANSCRIPTION ENCOUNTER (OUTPATIENT)
Age: 79
End: 2019-12-13

## 2019-12-13 PROBLEM — I10 ESSENTIAL (PRIMARY) HYPERTENSION: Chronic | Status: ACTIVE | Noted: 2019-12-11

## 2019-12-13 PROBLEM — G45.4 TRANSIENT GLOBAL AMNESIA: Chronic | Status: ACTIVE | Noted: 2019-12-11

## 2019-12-13 PROBLEM — E78.00 PURE HYPERCHOLESTEROLEMIA, UNSPECIFIED: Chronic | Status: ACTIVE | Noted: 2019-12-11

## 2019-12-13 LAB
ALBUMIN SERPL ELPH-MCNC: 4.1 G/DL — SIGNIFICANT CHANGE UP (ref 3.5–5.2)
ALP SERPL-CCNC: 81 U/L — SIGNIFICANT CHANGE UP (ref 30–115)
ALT FLD-CCNC: 19 U/L — SIGNIFICANT CHANGE UP (ref 0–41)
ANION GAP SERPL CALC-SCNC: 14 MMOL/L — SIGNIFICANT CHANGE UP (ref 7–14)
AST SERPL-CCNC: 17 U/L — SIGNIFICANT CHANGE UP (ref 0–41)
BASOPHILS # BLD AUTO: 0.04 K/UL — SIGNIFICANT CHANGE UP (ref 0–0.2)
BASOPHILS NFR BLD AUTO: 0.4 % — SIGNIFICANT CHANGE UP (ref 0–1)
BILIRUB SERPL-MCNC: 0.6 MG/DL — SIGNIFICANT CHANGE UP (ref 0.2–1.2)
BUN SERPL-MCNC: 14 MG/DL — SIGNIFICANT CHANGE UP (ref 10–20)
CALCIUM SERPL-MCNC: 9.9 MG/DL — SIGNIFICANT CHANGE UP (ref 8.5–10.1)
CHLORIDE SERPL-SCNC: 105 MMOL/L — SIGNIFICANT CHANGE UP (ref 98–110)
CO2 SERPL-SCNC: 25 MMOL/L — SIGNIFICANT CHANGE UP (ref 17–32)
CREAT SERPL-MCNC: 0.6 MG/DL — LOW (ref 0.7–1.5)
EOSINOPHIL # BLD AUTO: 0.04 K/UL — SIGNIFICANT CHANGE UP (ref 0–0.7)
EOSINOPHIL NFR BLD AUTO: 0.4 % — SIGNIFICANT CHANGE UP (ref 0–8)
GLUCOSE SERPL-MCNC: 107 MG/DL — HIGH (ref 70–99)
HCT VFR BLD CALC: 41.3 % — SIGNIFICANT CHANGE UP (ref 37–47)
HGB BLD-MCNC: 14.1 G/DL — SIGNIFICANT CHANGE UP (ref 12–16)
IMM GRANULOCYTES NFR BLD AUTO: 0.6 % — HIGH (ref 0.1–0.3)
LYMPHOCYTES # BLD AUTO: 1.39 K/UL — SIGNIFICANT CHANGE UP (ref 1.2–3.4)
LYMPHOCYTES # BLD AUTO: 14.5 % — LOW (ref 20.5–51.1)
MAGNESIUM SERPL-MCNC: 2 MG/DL — SIGNIFICANT CHANGE UP (ref 1.8–2.4)
MCHC RBC-ENTMCNC: 31 PG — SIGNIFICANT CHANGE UP (ref 27–31)
MCHC RBC-ENTMCNC: 34.1 G/DL — SIGNIFICANT CHANGE UP (ref 32–37)
MCV RBC AUTO: 90.8 FL — SIGNIFICANT CHANGE UP (ref 81–99)
MONOCYTES # BLD AUTO: 0.95 K/UL — HIGH (ref 0.1–0.6)
MONOCYTES NFR BLD AUTO: 9.9 % — HIGH (ref 1.7–9.3)
NEUTROPHILS # BLD AUTO: 7.09 K/UL — HIGH (ref 1.4–6.5)
NEUTROPHILS NFR BLD AUTO: 74.2 % — SIGNIFICANT CHANGE UP (ref 42.2–75.2)
NRBC # BLD: 0 /100 WBCS — SIGNIFICANT CHANGE UP (ref 0–0)
PLATELET # BLD AUTO: 302 K/UL — SIGNIFICANT CHANGE UP (ref 130–400)
POTASSIUM SERPL-MCNC: 4.7 MMOL/L — SIGNIFICANT CHANGE UP (ref 3.5–5)
POTASSIUM SERPL-SCNC: 4.7 MMOL/L — SIGNIFICANT CHANGE UP (ref 3.5–5)
PROT SERPL-MCNC: 6.8 G/DL — SIGNIFICANT CHANGE UP (ref 6–8)
RBC # BLD: 4.55 M/UL — SIGNIFICANT CHANGE UP (ref 4.2–5.4)
RBC # FLD: 11.3 % — LOW (ref 11.5–14.5)
SODIUM SERPL-SCNC: 144 MMOL/L — SIGNIFICANT CHANGE UP (ref 135–146)
WBC # BLD: 9.57 K/UL — SIGNIFICANT CHANGE UP (ref 4.8–10.8)
WBC # FLD AUTO: 9.57 K/UL — SIGNIFICANT CHANGE UP (ref 4.8–10.8)

## 2019-12-13 PROCEDURE — 99232 SBSQ HOSP IP/OBS MODERATE 35: CPT

## 2019-12-13 PROCEDURE — 95819 EEG AWAKE AND ASLEEP: CPT | Mod: 26

## 2019-12-13 PROCEDURE — 70551 MRI BRAIN STEM W/O DYE: CPT | Mod: 26

## 2019-12-13 RX ADMIN — Medication 100 MILLIGRAM(S): at 11:55

## 2019-12-13 RX ADMIN — Medication 50 MILLIGRAM(S): at 07:34

## 2019-12-13 RX ADMIN — ATORVASTATIN CALCIUM 40 MILLIGRAM(S): 80 TABLET, FILM COATED ORAL at 22:04

## 2019-12-13 RX ADMIN — ENOXAPARIN SODIUM 40 MILLIGRAM(S): 100 INJECTION SUBCUTANEOUS at 11:57

## 2019-12-13 RX ADMIN — Medication 2 MILLIGRAM(S): at 16:45

## 2019-12-13 RX ADMIN — LOSARTAN POTASSIUM 50 MILLIGRAM(S): 100 TABLET, FILM COATED ORAL at 07:34

## 2019-12-13 RX ADMIN — Medication 1 MILLIGRAM(S): at 11:55

## 2019-12-13 RX ADMIN — Medication 162 MILLIGRAM(S): at 11:55

## 2019-12-13 NOTE — DISCHARGE NOTE PROVIDER - CARE PROVIDER_API CALL
Kori Gray (DO)  Internal Medicine  [facility] [place] [address]  Phone: [phone]  Fax: [phone]  Follow Up Time:

## 2019-12-13 NOTE — PROGRESS NOTE ADULT - SUBJECTIVE AND OBJECTIVE BOX
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 Per family, patient was complaining of "not feeling right" the last week with URI symptoms (cough, fever) and went to PMD earlier this week, was sent home with presumed diagnosis of flu (not given any Tamiflu). She was saying that she was so tired throughout the week, had lost around 8 lbs this week, and last night patient told her daughter that she was "feeling so tired" before she went to bed around 8:30PM. Her family went to her house around 9AM and found patient standing in kitchen, confused, not able to answer questions, couldn't find words. Per her daughter, a similar episode happened around 8 or 9 years ago where she was confused and not answering questions, went to ED and was diagnosed with dehydration and UTI. Per daughter, patient spends a lot of time golfing near woods. ED Course: Stroke code was called, NIHSS was 7 but no TPA given due to patient being outside of window period. CTH failed to reveal acute intracranial pathology. CTA CTP failed to reveal an LVO or significant perfusion abnormality. Now vastly improved.      PAST MEDICAL & SURGICAL HISTORY:  Transient global amnesia  High cholesterol  HTN (hypertension)  No significant past surgical history      FAMILY HISTORY:  No pertinent family history in first degree relatives      Social History: (-) x 3    Allergies    No Known Drug Allergies  shellfish (Rash; Flushing; Drowsiness)    Intolerances        MEDICATIONS  (STANDING):  aspirin enteric coated 162 milliGRAM(s) Oral daily  atorvastatin 40 milliGRAM(s) Oral at bedtime  chlorhexidine 4% Liquid 1 Application(s) Topical <User Schedule>  enoxaparin Injectable 40 milliGRAM(s) SubCutaneous daily  folic acid 1 milliGRAM(s) Oral daily  LORazepam   Injectable 2 milliGRAM(s) IV Push once  losartan 50 milliGRAM(s) Oral daily  metoprolol succinate ER 50 milliGRAM(s) Oral daily  thiamine 100 milliGRAM(s) Oral daily    MEDICATIONS  (PRN):  LORazepam   Injectable 1 milliGRAM(s) IV Push every 2 hours PRN CIWA-Ar score increase by 2 points and a total score of 7 or less      Review of systems:    Constitutional: as per HPI  Eyes: No eye pain or discharge  ENMT:  No difficulty hearing; No sinus or throat pain  Neck: No pain or stiffness  Respiratory: No cough, wheezing, chills or hemoptysis  Cardiovascular: No chest pain, palpitations, shortness of breath, dyspnea on exertion  Gastrointestinal: No abdominal pain, nausea, vomiting or hematemesis; No diarrhea or constipation.   Genitourinary: No dysuria, frequency, hematuria or incontinence  Neurological: As per HPI  Skin: No rashes or lesions   Endocrine: No heat or cold intolerance; No hair loss  Musculoskeletal: No joint pain or swelling  Psychiatric: No depression, anxiety, mood swings  Heme/Lymph: No easy bruising or bleeding gums    Vital Signs Last 24 Hrs  T(C): 37.1 (13 Dec 2019 07:32), Max: 37.1 (13 Dec 2019 07:32)  T(F): 98.7 (13 Dec 2019 07:32), Max: 98.7 (13 Dec 2019 07:32)  HR: 107 (13 Dec 2019 07:32) (93 - 110)  BP: 137/84 (13 Dec 2019 07:32) (137/84 - 184/86)  BP(mean): --  RR: 18 (13 Dec 2019 07:32) (14 - 18)  SpO2: 98% (13 Dec 2019 07:32) (97% - 100%)    Examination:  General:  Appearance is consistent with chronologic age.  No abnormal facies.  Gross skin survey within normal limits.    Cognitive/Language:  The patient is oriented to person, place, time and date.  Recent and remote memory intact.  Fund of knowledge is intact and normal.  Language with normal repetition, comprehension and naming.  Nondysarthric.    Eyes: intact VA, VFF.  EOMI w/o nystagmus, skew or reported double vision.  PERRL.  No ptosis/weakness of eyelid closure.    Face:  Facial sensation normal V1 - 3, no facial asymmetry.    Ears/Nose/Throat:  Hearing grossly intact b/l.  Palate elevates midline.  Tongue and uvula midline.   Motor examination:   Normal tone, bulk and range of motion.  No tenderness, twitching, tremors or involuntary movements.  Formal Muscle Strength Testing: (MRC grade R/L) 5/5 UE; 5/5 LE.  No observable drift.  Reflexes:   2+ b/l pectoralis, biceps, triceps, brachioradialis, patella and Achilles.  Plantar response downgoing b/l.  Jaw jerk, Nataliia, clonus absent. Sensory examination:   Intact to light touch and pinprick, pain, temperature and proprioception and vibration in all extremities. Cerebellum:   FTN/HKS intact with normal EDWIGE in all limbs.  No dysmetria or dysdiadokinesia.      NIHSS 0        Labs:   CBC Full  -  ( 13 Dec 2019 04:30 )  WBC Count : 9.57 K/uL  RBC Count : 4.55 M/uL  Hemoglobin : 14.1 g/dL  Hematocrit : 41.3 %  Platelet Count - Automated : 302 K/uL  Mean Cell Volume : 90.8 fL  Mean Cell Hemoglobin : 31.0 pg  Mean Cell Hemoglobin Concentration : 34.1 g/dL  Auto Neutrophil # : 7.09 K/uL  Auto Lymphocyte # : 1.39 K/uL  Auto Monocyte # : 0.95 K/uL  Auto Eosinophil # : 0.04 K/uL  Auto Basophil # : 0.04 K/uL  Auto Neutrophil % : 74.2 %  Auto Lymphocyte % : 14.5 %  Auto Monocyte % : 9.9 %  Auto Eosinophil % : 0.4 %  Auto Basophil % : 0.4 %    12-13    144  |  105  |  14  ----------------------------<  107<H>  4.7   |  25  |  0.6<L>    Ca    9.9      13 Dec 2019 04:30  Phos  3.0     12-12  Mg     2.0     12-13    TPro  6.8  /  Alb  4.1  /  TBili  0.6  /  DBili  x   /  AST  17  /  ALT  19  /  AlkPhos  81  12-13    LIVER FUNCTIONS - ( 13 Dec 2019 04:30 )  Alb: 4.1 g/dL / Pro: 6.8 g/dL / ALK PHOS: 81 U/L / ALT: 19 U/L / AST: 17 U/L / GGT: x             Neuroimaging:  NCHCT: CT Head No Cont:   EXAM:  CT BRAIN          IMPRESSION:   No acute intracranial hemorrhage, mass effect, or demarcated territorial   infarct by CT.  Mild white matter microvascular ischemic change.      < from: CT Perfusion w/ Maps w/ IV Cont (12.11.19 @ 13:10) >    IMPRESSION:    1.  No evidence of large vessel occlusion. No definite perfusion   abnormality.    2.  Calcific plaque at the right ICA origin with about 50% stenosis.    3.  Motion artifact somewhat limits evaluation of the intracranial   circulation.    < from: EEG (12.12.19 @ 10:30) >  PDR  7-8 Hz superimposed by burst of lower range theta activity seen diffusely   over both hemispheres with shifting asymmetry    GENERALIZED SLOWING  Mild to moderate generalized slowing      FOCAL SLOWING  No focal slowing      AREA OF FOCAL SLOWING  As above      BREACH ARTIFACT  No breach artifact      Hyper Ventilation  Was not performed        Photic Stimulation  Was not performed      EPILEPTIFORM ACTIVITY  No clear epileptiform activity      TYPE  As above  As above      EVENTS  No events reported or recorded      IMPRESSIONS  This is an abnormal routine EEG due to the presence of generalized   slowing as described above      CLINICAL CORRELATION  Consistent with diffuse cerebral electrophysiological dysfunction   secondary to none specific cause clinical correlation is recomment        < end of copied text >

## 2019-12-13 NOTE — PROGRESS NOTE ADULT - SUBJECTIVE AND OBJECTIVE BOX
SUBJECTIVE:    Patient is a 78 y/o Female who presents with a chief complaint of AMS (11 Dec 2019 13:48)    Currently admitted to medicine with the primary diagnosis of AMS (altered mental status), possibly secondary to stroke.      Today is hospital day 2d. Patient was seen and examined at bedside. This morning she is resting comfortably in bed and reports no new issues or overnight events. Was given IV ativan and haldol yesterday for agitation (was crawling out of bed, ripping out IVs and EKG leads, and was placed on 1:1 sit). Her agitation significantly improved and she is A&OX3 today.     PAST MEDICAL & SURGICAL HISTORY  PAST MEDICAL & SURGICAL HISTORY:  Transient global amnesia  High cholesterol  HTN (hypertension)  No significant past surgical history    SOCIAL HISTORY:  Former smoker (quit 35 years ago), 25 pack years  Current drinker: About 2 12oz cans of beer/day   Never drugs  Not sexually active  Retired, lives alone  Golfs/plays tennis about 2-4 times per week    ALLERGIES:  No Known Drug Allergies  shellfish (Rash; Flushing; Drowsiness)    MEDICATIONS:  STANDING MEDICATIONS  aspirin enteric coated 162 milliGRAM(s) Oral daily  atorvastatin 40 milliGRAM(s) Oral at bedtime  chlorhexidine 4% Liquid 1 Application(s) Topical <User Schedule>  enoxaparin Injectable 40 milliGRAM(s) SubCutaneous daily  folic acid 1 milliGRAM(s) Oral daily  LORazepam   Injectable 2 milliGRAM(s) IV Push once  losartan 50 milliGRAM(s) Oral daily  metoprolol succinate ER 50 milliGRAM(s) Oral daily  thiamine 100 milliGRAM(s) Oral daily    PRN MEDICATIONS    VITALS:   T(F): 97.6  HR: 87  BP: 177/85  RR: 18  SpO2: 98%    LABS:                        14.2   7.67  )-----------( 271      ( 12 Dec 2019 07:46 )             42.0     12-12    141  |  102  |  11  ----------------------------<  118<H>  4.0   |  23  |  0.5<L>    Ca    9.5      12 Dec 2019 07:46  Phos  3.0     12-12  Mg     1.8     12-12    TPro  7.0  /  Alb  4.2  /  TBili  0.6  /  DBili  x   /  AST  20  /  ALT  20  /  AlkPhos  84  12-12    PT/INR - ( 11 Dec 2019 11:07 )   PT: 11.50 sec;   INR: 1.00 ratio         PTT - ( 11 Dec 2019 11:07 )  PTT:32.8 sec  Urinalysis Basic - ( 11 Dec 2019 11:07 )    Color: Light Yellow / Appearance: Clear / S.042 / pH: x  Gluc: x / Ketone: Negative  / Bili: Negative / Urobili: <2 mg/dL   Blood: x / Protein: Negative / Nitrite: Negative   Leuk Esterase: Negative / RBC: x / WBC x   Sq Epi: x / Non Sq Epi: x / Bacteria: x        Troponin T, Serum: <0.01 ng/mL (19 @ 11:07)      CARDIAC MARKERS ( 11 Dec 2019 11:07 )  x     / <0.01 ng/mL / x     / x     / x          RADIOLOGY:  < from: CT Perfusion w/ Maps w/ IV Cont (19 @ 13:10) >    IMPRESSION:    1.  No evidence of large vessel occlusion. No definite perfusion   abnormality.    2.  Calcific plaque at the right ICA origin with about 50% stenosis.    3.  Motion artifact somewhat limits evaluation of the intracranial   circulation.    4.  Bilateral palatine tonsillar prominence, correlate clinically for   tonsillitis.      < end of copied text >    < from: Xray Chest 1 View- PORTABLE-Urgent (19 @ 13:08) >    Impression:      No radiographic evidence of acute cardiopulmonary disease.        < end of copied text >    < from: CT Brain Stroke Protocol (19 @ 11:30) >    IMPRESSION:     1.  No evidence of acute intracranial hemorrhage or large territory   infarct.    2.  Moderate chronic microvascular changes.    Spoke with EMIL WILEY on 2019 11:31 AM with readback.        < end of copied text >      < from: Transthoracic Echocardiogram (19 @ 12:34) >      Summary:   1. LV Ejection Fraction by Ramirez's Method with a biplane EF of 67 %.   2. Mildly increased LV wall thickness.   3. Spectral Doppler shows impaired relaxation pattern of left   ventricular myocardial filling (Grade I diastolic dysfunction).   4. Color flow doppler and intravenous injection of agitated saline   demonstrates the presence of an intact intra atrial septum.    < end of copied text >      PHYSICAL EXAM:  GENERAL: Elderly F in NAD, no signs of respiratory distress  HEAD: Atraumatic  NECK: Supple  CHEST/LUNG: Clear to auscultation bilaterally; No wheeze or crackles  HEART: S1, S2; RRR; No murmurs, rubs, or gallops  ABDOMEN: BS+; Soft, Non-tender, Non-distended  EXTREMITIES:  2+ Peripheral Pulses, No clubbing, cyanosis, or edema  NEURO:     MENTAL STATUS: AAOx3    LANG/SPEECH: Fluent, intact naming, repetition & comprehension    CRANIAL NERVES:    II: Pupils equal and reactive, no RAPD, normal visual field and fundus    III, IV, VI: EOM intact, no gaze preference or deviation    V: normal    VII: no facial asymmetry    VIII: normal hearing to speech    MOTOR: 5/5 in both upper and lower extremities    REFLEXES: 2/4 throughout, bilateral flexor planters    SENSORY: Normal to touch, temperature & pin prick in all extremities    COORD: Normal finger to nose and heel to shin, no tremor, no dysmetria  SKIN: No rashes or lesions

## 2019-12-13 NOTE — OCCUPATIONAL THERAPY INITIAL EVALUATION ADULT - NS ASR APHASIA TYPE OT
pt able to answer questions throughtout eval with wording finding issues. Pt family reports marked improvement from admission.

## 2019-12-13 NOTE — PROGRESS NOTE ADULT - ASSESSMENT
Impression:  79 Y F with pmh of transient global ischemia (8-9 years ago), HTN, DLD presents to ED after being found by sister and daughter this morning with altered mental status. Patient BIBA for confuison, Stroke code was called, NIHSS was 7 but no TPA given due to patient being outside of window period. CTH failed to reveal acute intracranial pathology. CTA/CTP failed to reveal an LVO or significant perfusion abnormality. Now back to baseline. Generalized slowing on EEG.    Suggestion:  Follow up MRI brain.  Continue ASA and statin.   Seizure precaution.   Keep Magnesium >2.    Miles Lauren NP  [id number]

## 2019-12-13 NOTE — PROGRESS NOTE ADULT - ATTENDING COMMENTS
Patient seen and examined with NP.  Reviewed all pertinent labs, vitals and notes.  MRI brain pending    Plan as above
Examined the patient on 12/14. MRI is negative for acute ischemia. EEG showed general slowing. Exam is non focal. She is alert and oriented x3. Family were at the bedside. The event is likely due to TGA/TIA. Can be discharged from stroke unit.     I have personally seen and examined this patient.  I have fully participated in the care of this patient.  I have reviewed all pertinent clinical information, including history, physical exam, plan and note.   I have reviewed all pertinent clinical information and reviewed all relevant imaging and diagnostic studies personally.  Recommendations as above.  Agree with above assessment except as noted.

## 2019-12-13 NOTE — DISCHARGE NOTE PROVIDER - INSTRUCTIONS
DASH diet (low salt). You should follow DASH diet with 1.5litres of fluid restriction daily. DASH stands for Dietary Approaches to Stop Hypertension.  DASH lowers high blood pressure and improves levels of cholesterol. This reduces your risk of getting heart disease.The DASH eating planEmphasizes vegetables, fruits, and whole-grains  Includes fat-free or low-fat dairy products, fish, poultry, beans, nuts, and vegetable oils  Limits foods that are high in saturated fat. These foods include fatty meats, full-fat dairy products, and tropical oils such as coconut, palm kernel, and palm oils.  Limits sugar-sweetened beverages and sweets  Along with DASH, other lifestyle changes can help lower your blood pressure. They include staying at a healthy weight, exercising, and not smoking.DASH diet (low salt).

## 2019-12-13 NOTE — PROGRESS NOTE ADULT - SUBJECTIVE AND OBJECTIVE BOX
Neurology Follow up note  Patient examined at bedside she is currently lethargic but easily arousable  (received ativan for MRI). Not fully co-operative during exam. She was able to tell me her name , is noted to be stuttering. She moves all extremities equally.  No acute events      Vital Signs Last 24 Hrs  T(C): 36.7 (13 Dec 2019 15:26), Max: 37.1 (13 Dec 2019 07:32)  T(F): 98.1 (13 Dec 2019 15:26), Max: 98.7 (13 Dec 2019 07:32)  HR: 87 (13 Dec 2019 15:26) (87 - 107)  BP: 176/88 (13 Dec 2019 15:26) (137/84 - 184/86)  BP(mean): --  RR: 18 (13 Dec 2019 15:26) (14 - 18)  SpO2: 98% (13 Dec 2019 15:26) (97% - 98%)          Neurological Exam:   Mental status: Lethargic easily arousable follows commands at random  Cranial nerves: PERRLA, eyes midline, responds to threat, stuttering speech  Motor:  moving all 4 exts and it is weak equally  Sensation: Responds to pain all 4 exts  Coordination: Unable to assess  Gait: deferred      Medications  aspirin enteric coated 162 milliGRAM(s) Oral daily  atorvastatin 40 milliGRAM(s) Oral at bedtime  chlorhexidine 4% Liquid 1 Application(s) Topical <User Schedule>  enoxaparin Injectable 40 milliGRAM(s) SubCutaneous daily  folic acid 1 milliGRAM(s) Oral daily  LORazepam   Injectable 1 milliGRAM(s) IV Push every 2 hours PRN  losartan 50 milliGRAM(s) Oral daily  metoprolol succinate ER 50 milliGRAM(s) Oral daily  thiamine 100 milliGRAM(s) Oral daily      Lab  Comprehensive Metabolic Panel in AM (12.13.19 @ 04:30)    Sodium, Serum: 144 mmol/L    Potassium, Serum: 4.7 mmol/L    Chloride, Serum: 105 mmol/L    Carbon Dioxide, Serum: 25 mmol/L    Anion Gap, Serum: 14 mmol/L    Blood Urea Nitrogen, Serum: 14 mg/dL    Creatinine, Serum: 0.6 mg/dL    Glucose, Serum: 107 mg/dL    Calcium, Total Serum: 9.9 mg/dL    Protein Total, Serum: 6.8 g/dL    Albumin, Serum: 4.1 g/dL    Bilirubin Total, Serum: 0.6 mg/dL    Alkaline Phosphatase, Serum: 81 U/L    Aspartate Aminotransferase (AST/SGOT): 17 U/L    Alanine Aminotransferase (ALT/SGPT): 19 U/L    eGFR if Non : 88: Interpretative comment    Complete Blood Count + Automated Diff in AM (12.13.19 @ 04:30)    WBC Count: 9.57 K/uL    RBC Count: 4.55 M/uL    Hemoglobin: 14.1 g/dL    Hematocrit: 41.3 %    Mean Cell Volume: 90.8 fL    Mean Cell Hemoglobin: 31.0 pg    Mean Cell Hemoglobin Conc: 34.1 g/dL    Red Cell Distrib Width: 11.3 %    Platelet Count - Automated: 302 K/uL    Auto Neutrophil #: 7.09 K/uL    Auto Lymphocyte #: 1.39 K/uL    Auto Monocyte #: 0.95 K/uL    Auto Eosinophil #: 0.04 K/uL    Auto Basophil #: 0.04 K/uL    Auto Neutrophil %: 74.2: Differential percentages must be correlated with absolute numbers for  clinical significance. %    Auto Lymphocyte %: 14.5 %    Auto Monocyte %: 9.9 %    Auto Eosinophil %: 0.4 %    Auto Basophil %: 0.4 %    Auto Immature Granulocyte %: 0.6 %    Nucleated RBC: 0 /100 WBCs    Magnesium, Serum in AM (12.13.19 @ 04:30)    Magnesium, Serum: 2.0 mg/dL    Lipid Profile (12.12.19 @ 07:46)    Total Cholesterol/HDL Ratio Measurement: 2.4 Ratio    Cholesterol, Serum: 166 mg/dL    Triglycerides, Serum: 75 mg/dL    HDL Cholesterol, Serum: 68: HDL Levels >/= 60 mg/dL are considered beneficial and a "negative" risk  factor.  Effective 08/15/2018: New reference range and interpretive comment. mg/dL    Direct LDL: 89: LDL Cholesterol (mg/dL) --- Interpretive Comment (for adults 18 and over)    Hemoglobin A1C with Mean Plasma Glucose (12.12.19 @ 07:46)    Hemoglobin A1C, Whole Blood: 5.5: Method: Immunoassay         Mean Plasma Glucose: 111 mg/dL          Radiology  < from: CT Head No Cont (12.12.19 @ 18:08) >  fINDINGS:  The ventricles and cortical sulci are within normal limits for age.  Mild white matter microvascular ischemic change.  No acute intracranial hemorrhage or mass effect.  No CT evidence of demarcated territorial infarct.  Calvarium is intact..  Status post bilateral lens replacement.  There is mild opacification of the right sphenoid sinus and a few ethmoid   air cells. The mastoids are well aerated.    IMPRESSION:   No acute intracranial hemorrhage, mass effect, or demarcated territorial   infarct by CT.  Mild white matter microvascular ischemic change.    < end of copied text >      < from: EEG (12.12.19 @ 10:30) >  DAO  Awake and Drowsy     SYMMETRY  Symmetrical     ORGANIZATION:  Less than optimally organized      PDR  7-8 Hz superimposed by burst of lower range theta activity seen diffusely   over both hemispheres with shifting asymmetry    GENERALIZED SLOWING  Mild to moderate generalized slowing      FOCAL SLOWING  No focal slowing      AREA OF FOCAL SLOWING  As above      BREACH ARTIFACT  No breach artifact      Hyper Ventilation  Was not performed        Photic Stimulation  Was not performed      EPILEPTIFORM ACTIVITY  No clear epileptiform activity      TYPE  As above  As above      EVENTS  No events reported or recorded      IMPRESSIONS  This is an abnormal routine EEG due to the presence of generalized   slowing as described above      CLINICAL CORRELATION  Consistent with diffuse cerebral electrophysiological dysfunction   secondary to none specific cause clinical correlation is recomment    < end of copied text > Neurology Follow up note  Patient examined at bedside she is currently lethargic but easily arousable  (received ativan for MRI). Not fully co-operative during exam.  She moves all extremities equally.  No acute events      Vital Signs Last 24 Hrs  T(C): 36.7 (13 Dec 2019 15:26), Max: 37.1 (13 Dec 2019 07:32)  T(F): 98.1 (13 Dec 2019 15:26), Max: 98.7 (13 Dec 2019 07:32)  HR: 87 (13 Dec 2019 15:26) (87 - 107)  BP: 176/88 (13 Dec 2019 15:26) (137/84 - 184/86)  BP(mean): --  RR: 18 (13 Dec 2019 15:26) (14 - 18)  SpO2: 98% (13 Dec 2019 15:26) (97% - 98%)          Neurological Exam:   Mental status: Lethargic easily arousable follows commands at random, speech fluent  Cranial nerves: PERRLA, eyes midline, responds to threat, speech fluent  Motor:  moving all 4 exts and it is weak equally  Sensation: Responds to pain all 4 exts  Coordination: Unable to assess  Gait: deferred      Medications  aspirin enteric coated 162 milliGRAM(s) Oral daily  atorvastatin 40 milliGRAM(s) Oral at bedtime  chlorhexidine 4% Liquid 1 Application(s) Topical <User Schedule>  enoxaparin Injectable 40 milliGRAM(s) SubCutaneous daily  folic acid 1 milliGRAM(s) Oral daily  LORazepam   Injectable 1 milliGRAM(s) IV Push every 2 hours PRN  losartan 50 milliGRAM(s) Oral daily  metoprolol succinate ER 50 milliGRAM(s) Oral daily  thiamine 100 milliGRAM(s) Oral daily      Lab  Comprehensive Metabolic Panel in AM (12.13.19 @ 04:30)    Sodium, Serum: 144 mmol/L    Potassium, Serum: 4.7 mmol/L    Chloride, Serum: 105 mmol/L    Carbon Dioxide, Serum: 25 mmol/L    Anion Gap, Serum: 14 mmol/L    Blood Urea Nitrogen, Serum: 14 mg/dL    Creatinine, Serum: 0.6 mg/dL    Glucose, Serum: 107 mg/dL    Calcium, Total Serum: 9.9 mg/dL    Protein Total, Serum: 6.8 g/dL    Albumin, Serum: 4.1 g/dL    Bilirubin Total, Serum: 0.6 mg/dL    Alkaline Phosphatase, Serum: 81 U/L    Aspartate Aminotransferase (AST/SGOT): 17 U/L    Alanine Aminotransferase (ALT/SGPT): 19 U/L    eGFR if Non : 88: Interpretative comment    Complete Blood Count + Automated Diff in AM (12.13.19 @ 04:30)    WBC Count: 9.57 K/uL    RBC Count: 4.55 M/uL    Hemoglobin: 14.1 g/dL    Hematocrit: 41.3 %    Mean Cell Volume: 90.8 fL    Mean Cell Hemoglobin: 31.0 pg    Mean Cell Hemoglobin Conc: 34.1 g/dL    Red Cell Distrib Width: 11.3 %    Platelet Count - Automated: 302 K/uL    Auto Neutrophil #: 7.09 K/uL    Auto Lymphocyte #: 1.39 K/uL    Auto Monocyte #: 0.95 K/uL    Auto Eosinophil #: 0.04 K/uL    Auto Basophil #: 0.04 K/uL    Auto Neutrophil %: 74.2: Differential percentages must be correlated with absolute numbers for  clinical significance. %    Auto Lymphocyte %: 14.5 %    Auto Monocyte %: 9.9 %    Auto Eosinophil %: 0.4 %    Auto Basophil %: 0.4 %    Auto Immature Granulocyte %: 0.6 %    Nucleated RBC: 0 /100 WBCs    Magnesium, Serum in AM (12.13.19 @ 04:30)    Magnesium, Serum: 2.0 mg/dL    Lipid Profile (12.12.19 @ 07:46)    Total Cholesterol/HDL Ratio Measurement: 2.4 Ratio    Cholesterol, Serum: 166 mg/dL    Triglycerides, Serum: 75 mg/dL    HDL Cholesterol, Serum: 68: HDL Levels >/= 60 mg/dL are considered beneficial and a "negative" risk  factor.  Effective 08/15/2018: New reference range and interpretive comment. mg/dL    Direct LDL: 89: LDL Cholesterol (mg/dL) --- Interpretive Comment (for adults 18 and over)    Hemoglobin A1C with Mean Plasma Glucose (12.12.19 @ 07:46)    Hemoglobin A1C, Whole Blood: 5.5: Method: Immunoassay         Mean Plasma Glucose: 111 mg/dL          Radiology  < from: CT Head No Cont (12.12.19 @ 18:08) >  fINDINGS:  The ventricles and cortical sulci are within normal limits for age.  Mild white matter microvascular ischemic change.  No acute intracranial hemorrhage or mass effect.  No CT evidence of demarcated territorial infarct.  Calvarium is intact..  Status post bilateral lens replacement.  There is mild opacification of the right sphenoid sinus and a few ethmoid   air cells. The mastoids are well aerated.    IMPRESSION:   No acute intracranial hemorrhage, mass effect, or demarcated territorial   infarct by CT.  Mild white matter microvascular ischemic change.          REEG>      < from: EEG (12.12.19 @ 10:30) >  DAO  Awake and Drowsy     SYMMETRY  Symmetrical     ORGANIZATION:  Less than optimally organized      PDR  7-8 Hz superimposed by burst of lower range theta activity seen diffusely   over both hemispheres with shifting asymmetry    GENERALIZED SLOWING  Mild to moderate generalized slowing      FOCAL SLOWING  No focal slowing      AREA OF FOCAL SLOWING  As above      BREACH ARTIFACT  No breach artifact      Hyper Ventilation  Was not performed        Photic Stimulation  Was not performed      EPILEPTIFORM ACTIVITY  No clear epileptiform activity      TYPE  As above  As above      EVENTS  No events reported or recorded      IMPRESSIONS  This is an abnormal routine EEG due to the presence of generalized   slowing as described above      CLINICAL CORRELATION  Consistent with diffuse cerebral electrophysiological dysfunction   secondary to none specific cause clinical correlation is recomment        ECHO    < from: Transthoracic Echocardiogram (12.12.19 @ 12:34) >  Summary:   1. LV Ejection Fraction by Ramirez's Method with a biplane EF of 67 %.   2. Mildly increased LV wall thickness.   3. Spectral Doppler shows impaired relaxation pattern of left   ventricular myocardial filling (Grade I diastolic dysfunction).   4. Color flow doppler and intravenous injection of agitated saline   demonstrates the presence of an intact intra atrial septum.    < end of copied text >

## 2019-12-13 NOTE — DISCHARGE NOTE PROVIDER - NSDCMRMEDTOKEN_GEN_ALL_CORE_FT
aspirin 81 mg oral tablet, chewable:   folic acid:   irbesartan 150 mg oral tablet:   Metoprolol Tartrate 50 mg oral tablet:   pravastatin 40 mg oral tablet: aspirin 81 mg oral delayed release tablet: 2 tab(s) orally once a day  folic acid 1 mg oral tablet: 1 tab(s) orally once a day  irbesartan 150 mg oral tablet:   metoprolol succinate 50 mg oral tablet, extended release: 1 tab(s) orally once a day  Multiple Vitamins oral tablet: 1 tab(s) orally once a day  pravastatin 40 mg oral tablet:

## 2019-12-13 NOTE — DISCHARGE NOTE PROVIDER - HOSPITAL COURSE
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 Per family, patient was complaining of "not feeling right" the last week with URI symptoms (cough, fever) and went to PMD earlier this week, was sent home with presumed diagnosis of flu (not given any Tamiflu). She was saying that she was so tired throughout the week, had lost around 8 lbs this week, and last night patient told her daughter that she was "feeling so tired" before she went to bed around 8:30PM. Her family went to her house around 9AM and found patient standing in kitchen, confused, not able to answer questions, couldn't find words. Per her daughter, a similar episode happened around 8 or 9 years ago where she was confused and not answering questions, went to ED and was diagnosed with dehydration and UTI. Per daughter, patient spends a lot of time golfing [place] woods.     ED Course: Stroke code was called, NIHSS was 7 but no TPA given due to patient being outside of window period. CTH failed to reveal acute intracranial pathology. CTA CTP failed to reveal an LVO or significant perfusion abnormality. Vitals signs T 97 F, /67, P 105, R 18, S 94% RA.         CT head/perfusion studies negative. Patient's mental status improved. Patient was seen and evaluated by neurology. To be discharged home with close outpatient follow up. 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 Per family, patient was complaining of "not feeling right" the last week with URI symptoms (cough, fever) and went to PMD earlier this week, was sent home with presumed diagnosis of flu (not given any Tamiflu). She was saying that she was so tired throughout the week, had lost around 8 lbs this week, and last night patient told her daughter that she was "feeling so tired" before she went to bed around 8:30PM. Her family went to her house around 9AM and found patient standing in kitchen, confused, not able to answer questions, couldn't find words. Per her daughter, a similar episode happened around 8 or 9 years ago where she was confused and not answering questions, went to ED and was diagnosed with dehydration and UTI. Per daughter, patient spends a lot of time golfing near woods.     ED Course: Stroke code was called, NIHSS was 7 but no TPA given due to patient being outside of window period. CTH failed to reveal acute intracranial pathology. CTA CTP failed to reveal an LVO or significant perfusion abnormality. Vitals signs T 97 F, /67, P 105, R 18, S 94% RA.         CT head/perfusion studies negative. Patient's mental status improved. Patient was seen and evaluated by neurology. To be discharged home with close outpatient follow up. She wored with PT today, independantly ambulated. Physiatry has her as well and is medically stable for discharge from their standpoint.        Medical Reconciliation has been reviewed with Medical Attending. All consults cleared for discharge. Discharge instructions discussed and patient aware when to seek medical attention. Patient has proper follow up. All resulted including diagnosis and patient aware they require further follow up. Stressed importance of proper follow up. Medications sent to the pharmacy , checked insurance coverage and changes discussed. All questions and concerns from patient and family addressed. Understanding of instructions verbalized. 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 Per family, patient was complaining of "not feeling right" the last week with URI symptoms (cough, fever) and went to PMD earlier this week, was sent home with presumed diagnosis of flu (not given any Tamiflu). She was saying that she was so tired throughout the week, had lost around 8 lbs this week, and last night patient told her daughter that she was "feeling so tired" before she went to bed around 8:30PM. Her family went to her house around 9AM and found patient standing in kitchen, confused, not able to answer questions, couldn't find words. Per her daughter, a similar episode happened around 8 or 9 years ago where she was confused and not answering questions, went to ED and was diagnosed with dehydration and UTI. Per daughter, patient spends a lot of time golfing near woods.     ED Course: Stroke code was called, NIHSS was 7 but no TPA given due to patient being outside of window period. CTH failed to reveal acute intracranial pathology. CTA CTP failed to reveal an LVO or significant perfusion abnormality. Vitals signs T 97 F, /67, P 105, R 18, S 94% RA.         CT head/perfusion studies negative. Patient's mental status improved. Patient was seen and evaluated by neurology. To be discharged home with close outpatient follow up. She wored with PT today, independantly ambulated. Physiatry has her as well and is medically stable for discharge from their standpoint.        Medical Reconciliation has been reviewed with Medical Attending. All consults cleared for discharge. Discharge instructions discussed and patient aware when to seek medical attention. Patient has proper follow up. All resulted including diagnosis and patient aware they require further follow up. Stressed importance of proper follow up.  All questions and concerns from patient and family addressed. Understanding of instructions verbalized.

## 2019-12-13 NOTE — PROGRESS NOTE ADULT - SUBJECTIVE AND OBJECTIVE BOX
Patient was seen and examined. Spoke with RN. Chart reviewed.  delirium overnight resolved    Vital Signs Last 24 Hrs  T(F): 98.7 (13 Dec 2019 07:32), Max: 98.7 (13 Dec 2019 07:32)  HR: 107 (13 Dec 2019 07:32) (96 - 110)  BP: 137/84 (13 Dec 2019 07:32) (137/84 - 184/86)  SpO2: 98% (13 Dec 2019 07:32) (97% - 99%)  MEDICATIONS  (STANDING):  aspirin enteric coated 162 milliGRAM(s) Oral daily  atorvastatin 40 milliGRAM(s) Oral at bedtime  chlorhexidine 4% Liquid 1 Application(s) Topical <User Schedule>  enoxaparin Injectable 40 milliGRAM(s) SubCutaneous daily  folic acid 1 milliGRAM(s) Oral daily  LORazepam   Injectable 2 milliGRAM(s) IV Push once  losartan 50 milliGRAM(s) Oral daily  metoprolol succinate ER 50 milliGRAM(s) Oral daily  thiamine 100 milliGRAM(s) Oral daily    MEDICATIONS  (PRN):  LORazepam   Injectable 1 milliGRAM(s) IV Push every 2 hours PRN CIWA-Ar score increase by 2 points and a total score of 7 or less    Labs:                        14.1   9.57  )-----------( 302      ( 13 Dec 2019 04:30 )             41.3                         14.2   7.67  )-----------( 271      ( 12 Dec 2019 07:46 )             42.0     13 Dec 2019 04:30    144    |  105    |  14     ----------------------------<  107    4.7     |  25     |  0.6    12 Dec 2019 07:46    141    |  102    |  11     ----------------------------<  118    4.0     |  23     |  0.5      Ca    9.9        13 Dec 2019 04:30  Ca    9.5        12 Dec 2019 07:46  Phos  3.0       12 Dec 2019 07:46  Mg     2.0       13 Dec 2019 04:30  Mg     1.8       12 Dec 2019 07:46    TPro  6.8    /  Alb  4.1    /  TBili  0.6    /  DBili  x      /  AST  17     /  ALT  19     /  AlkPhos  81     13 Dec 2019 04:30  TPro  7.0    /  Alb  4.2    /  TBili  0.6    /  DBili  x      /  AST  20     /  ALT  20     /  AlkPhos  84     12 Dec 2019 07:46          Culture - Blood (collected 11 Dec 2019 11:13)  Source: .Blood Blood  Preliminary Report (12 Dec 2019 18:01):    No growth to date.    Culture - Blood (collected 11 Dec 2019 11:13)  Source: .Blood Blood  Preliminary Report (12 Dec 2019 18:01):    No growth to date.      General: comfortable, NAD  Neurology: A&Ox3, nonfocal  Head:  Normocephalic, atraumatic  ENT:  Mucosa moist, no ulcerations  Neck:  Supple, no JVD,   Skin: no breakdowns (as per RN)  Resp: CTA B/L  CV: RRR, S1S2,   GI: Soft, NT, bowel sounds  MS: No edema, + peripheral pulses, FROM all 4 extremity      A/P:  Patient seen and evaluated in the ED.   Encephalopathy with expressive aphasia resolved   r/o CVA vs TIA     CTH failed to reveal acute intracranial pathology. CTA/CTP failed to reveal significant perfusion abnormality  MRI brain   Continue ASA/statin.   Seizure precaution.   monitor electrolytes replete as needed   monitor for withdrawals   SLP f/u   PT/Rehab   d/c when cleared by neurology   DVT prophylaxis  Decubitus prevention- all measures as per RN protocol  Please call or text me with any questions or updates

## 2019-12-13 NOTE — PROGRESS NOTE ADULT - ASSESSMENT
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 Initial NIHSS was 7 but no TPA given due to patient being outside of window period. CTH failed to reveal acute intracranial pathology. CTA/CTP failed to reveal an LVO or significant perfusion abnormality. Generalized slowing on EEG. This exam was limited due to sedation given for MRI.          Plan  Continue stroke unit  Follow up pending results of MRI brain.  Continue ASA and statin.   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 Initial NIHSS was 7 but no TPA given due to patient being outside of window period. CTH failed to reveal acute intracranial pathology. CTA/CTP failed to reveal an LVO or significant perfusion abnormality. Generalized slowing on EEG. This exam was limited due to sedation given for MRI.      Labs and imaging so far:  CTH : negative for acute stroke  CTA/P: No lvo or perfusion deficits  MRI brain n/c: pending results  ECHO: EF 67% No PFO  EEG: Generalized slowing  LDL: 89  HBAIC: 5.5        Plan  Continue stroke unit  Follow up pending results of MRI brain. If MRI brain (+ ) for cva then will need appointment with stroke clinic in 2-3 weeks  Continue ASA and statin.   continue q 4 neuro checks  PT/REHAB EVAL

## 2019-12-13 NOTE — DISCHARGE NOTE PROVIDER - NSDCCPCAREPLAN_GEN_ALL_CORE_FT
PRINCIPAL DISCHARGE DIAGNOSIS  Diagnosis: AMS (altered mental status)  Assessment and Plan of Treatment: - CT head and CT perfusion negative for acute stroke.   - Take all medications as prescribed  - follow up with primary care doctor within 1 week of discharge  - return to ER if symptoms persist or worsen.      SECONDARY DISCHARGE DIAGNOSES  Diagnosis: Hypertension  Assessment and Plan of Treatment: - measures blood pressure at home and record readings.   - bring log to primary care doctor  - take all medications as prescribed PRINCIPAL DISCHARGE DIAGNOSIS  Diagnosis: AMS (altered mental status)  Assessment and Plan of Treatment: - CT head and CT perfusion negative for acute stroke. You probably had Transient Ischemia to the brain causing your altered mentation  - Take all medications as prescribed  - follow up with primary care doctor within 1 week of discharge  - return to ER if symptoms persist or worsen.      SECONDARY DISCHARGE DIAGNOSES  Diagnosis: Hypertension  Assessment and Plan of Treatment: - measures blood pressure at home and record readings.   - bring log to primary care doctor  - take all medications as prescribed

## 2019-12-14 LAB
ALBUMIN SERPL ELPH-MCNC: 3.9 G/DL — SIGNIFICANT CHANGE UP (ref 3.5–5.2)
ALP SERPL-CCNC: 76 U/L — SIGNIFICANT CHANGE UP (ref 30–115)
ALT FLD-CCNC: 23 U/L — SIGNIFICANT CHANGE UP (ref 0–41)
ANION GAP SERPL CALC-SCNC: 15 MMOL/L — HIGH (ref 7–14)
AST SERPL-CCNC: 22 U/L — SIGNIFICANT CHANGE UP (ref 0–41)
BASOPHILS # BLD AUTO: 0.05 K/UL — SIGNIFICANT CHANGE UP (ref 0–0.2)
BASOPHILS NFR BLD AUTO: 0.6 % — SIGNIFICANT CHANGE UP (ref 0–1)
BILIRUB SERPL-MCNC: 0.5 MG/DL — SIGNIFICANT CHANGE UP (ref 0.2–1.2)
BUN SERPL-MCNC: 19 MG/DL — SIGNIFICANT CHANGE UP (ref 10–20)
CALCIUM SERPL-MCNC: 9.5 MG/DL — SIGNIFICANT CHANGE UP (ref 8.5–10.1)
CHLORIDE SERPL-SCNC: 103 MMOL/L — SIGNIFICANT CHANGE UP (ref 98–110)
CO2 SERPL-SCNC: 25 MMOL/L — SIGNIFICANT CHANGE UP (ref 17–32)
CREAT SERPL-MCNC: 0.7 MG/DL — SIGNIFICANT CHANGE UP (ref 0.7–1.5)
EOSINOPHIL # BLD AUTO: 0.17 K/UL — SIGNIFICANT CHANGE UP (ref 0–0.7)
EOSINOPHIL NFR BLD AUTO: 2 % — SIGNIFICANT CHANGE UP (ref 0–8)
GLUCOSE SERPL-MCNC: 89 MG/DL — SIGNIFICANT CHANGE UP (ref 70–99)
HCT VFR BLD CALC: 41.6 % — SIGNIFICANT CHANGE UP (ref 37–47)
HGB BLD-MCNC: 14.1 G/DL — SIGNIFICANT CHANGE UP (ref 12–16)
IMM GRANULOCYTES NFR BLD AUTO: 0.8 % — HIGH (ref 0.1–0.3)
LYMPHOCYTES # BLD AUTO: 1.98 K/UL — SIGNIFICANT CHANGE UP (ref 1.2–3.4)
LYMPHOCYTES # BLD AUTO: 23.5 % — SIGNIFICANT CHANGE UP (ref 20.5–51.1)
MAGNESIUM SERPL-MCNC: 2 MG/DL — SIGNIFICANT CHANGE UP (ref 1.8–2.4)
MCHC RBC-ENTMCNC: 31.1 PG — HIGH (ref 27–31)
MCHC RBC-ENTMCNC: 33.9 G/DL — SIGNIFICANT CHANGE UP (ref 32–37)
MCV RBC AUTO: 91.6 FL — SIGNIFICANT CHANGE UP (ref 81–99)
MONOCYTES # BLD AUTO: 0.93 K/UL — HIGH (ref 0.1–0.6)
MONOCYTES NFR BLD AUTO: 11 % — HIGH (ref 1.7–9.3)
NEUTROPHILS # BLD AUTO: 5.24 K/UL — SIGNIFICANT CHANGE UP (ref 1.4–6.5)
NEUTROPHILS NFR BLD AUTO: 62.1 % — SIGNIFICANT CHANGE UP (ref 42.2–75.2)
NRBC # BLD: 0 /100 WBCS — SIGNIFICANT CHANGE UP (ref 0–0)
PLATELET # BLD AUTO: 359 K/UL — SIGNIFICANT CHANGE UP (ref 130–400)
POTASSIUM SERPL-MCNC: 4.6 MMOL/L — SIGNIFICANT CHANGE UP (ref 3.5–5)
POTASSIUM SERPL-SCNC: 4.6 MMOL/L — SIGNIFICANT CHANGE UP (ref 3.5–5)
PROT SERPL-MCNC: 6.7 G/DL — SIGNIFICANT CHANGE UP (ref 6–8)
RBC # BLD: 4.54 M/UL — SIGNIFICANT CHANGE UP (ref 4.2–5.4)
RBC # FLD: 11.5 % — SIGNIFICANT CHANGE UP (ref 11.5–14.5)
SODIUM SERPL-SCNC: 143 MMOL/L — SIGNIFICANT CHANGE UP (ref 135–146)
WBC # BLD: 8.44 K/UL — SIGNIFICANT CHANGE UP (ref 4.8–10.8)
WBC # FLD AUTO: 8.44 K/UL — SIGNIFICANT CHANGE UP (ref 4.8–10.8)

## 2019-12-14 PROCEDURE — 99232 SBSQ HOSP IP/OBS MODERATE 35: CPT

## 2019-12-14 RX ADMIN — Medication 50 MILLIGRAM(S): at 05:27

## 2019-12-14 RX ADMIN — ENOXAPARIN SODIUM 40 MILLIGRAM(S): 100 INJECTION SUBCUTANEOUS at 11:46

## 2019-12-14 RX ADMIN — LOSARTAN POTASSIUM 50 MILLIGRAM(S): 100 TABLET, FILM COATED ORAL at 05:27

## 2019-12-14 RX ADMIN — CHLORHEXIDINE GLUCONATE 1 APPLICATION(S): 213 SOLUTION TOPICAL at 05:26

## 2019-12-14 RX ADMIN — Medication 162 MILLIGRAM(S): at 11:46

## 2019-12-14 RX ADMIN — Medication 100 MILLIGRAM(S): at 11:46

## 2019-12-14 RX ADMIN — Medication 1 MILLIGRAM(S): at 11:46

## 2019-12-14 RX ADMIN — ATORVASTATIN CALCIUM 40 MILLIGRAM(S): 80 TABLET, FILM COATED ORAL at 22:41

## 2019-12-14 NOTE — PROGRESS NOTE ADULT - ASSESSMENT
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 Stroke code was called, NIHSS 7, no TPA given, imaging so far negative.     # AMS - NIHSS 7 on admission. Rule out stroke vs. wernicke encephalopathy vs. other  - CT head/perfusion studies negative so far  - admit to stroke unit  - MRI shows no acute infarct  - TTE: EF = 67%, demonstrates the presence of an intact intra atrial septum.  - EEG: generalized slowing   - f/u PT/rehab/OT.   - Continue asa and statin  - Neuro f/u    # HTN  - Losartan and Metoprolol tartrate    # Thiamine and folate deficiency secondary to EtOH abuse  - CIWA protocol PRN  - due to prolonged EtOH use  - Supplement    Diet - DASH  DVT PPx - SCD  GI ppx - not indicated  Dispo - From home, lives by herself, possible need social eval   FULL CODE

## 2019-12-14 NOTE — PROGRESS NOTE ADULT - SUBJECTIVE AND OBJECTIVE BOX
Patient was seen and examined. Spoke with RN. Chart reviewed.  speech has significantly improved,  daughter at bedside, extensive discussion,    No events overnight.  Vital Signs Last 24 Hrs  T(F): 98.1 (14 Dec 2019 05:54), Max: 98.1 (13 Dec 2019 15:26)  HR: 82 (14 Dec 2019 11:57) (82 - 87)  BP: 118/63 (14 Dec 2019 11:57) (118/63 - 182/88)  SpO2: 95% (14 Dec 2019 11:57) (95% - 98%)  MEDICATIONS  (STANDING):  aspirin enteric coated 162 milliGRAM(s) Oral daily  atorvastatin 40 milliGRAM(s) Oral at bedtime  chlorhexidine 4% Liquid 1 Application(s) Topical <User Schedule>  enoxaparin Injectable 40 milliGRAM(s) SubCutaneous daily  folic acid 1 milliGRAM(s) Oral daily  losartan 50 milliGRAM(s) Oral daily  metoprolol succinate ER 50 milliGRAM(s) Oral daily  thiamine 100 milliGRAM(s) Oral daily    MEDICATIONS  (PRN):  LORazepam   Injectable 1 milliGRAM(s) IV Push every 2 hours PRN CIWA-Ar score increase by 2 points and a total score of 7 or less    Labs:                        14.1   8.44  )-----------( 359      ( 14 Dec 2019 06:40 )             41.6                         14.1   9.57  )-----------( 302      ( 13 Dec 2019 04:30 )             41.3     14 Dec 2019 06:40    143    |  103    |  19     ----------------------------<  89     4.6     |  25     |  0.7    13 Dec 2019 04:30    144    |  105    |  14     ----------------------------<  107    4.7     |  25     |  0.6      Ca    9.5        14 Dec 2019 06:40  Ca    9.9        13 Dec 2019 04:30  Mg     2.0       14 Dec 2019 06:40  Mg     2.0       13 Dec 2019 04:30    TPro  6.7    /  Alb  3.9    /  TBili  0.5    /  DBili  x      /  AST  22     /  ALT  23     /  AlkPhos  76     14 Dec 2019 06:40  TPro  6.8    /  Alb  4.1    /  TBili  0.6    /  DBili  x      /  AST  17     /  ALT  19     /  AlkPhos  81     13 Dec 2019 04:30            Radiology:    General: comfortable, NAD  Neurology: A&Ox3, nonfocal  Head:  Normocephalic, atraumatic  ENT:  Mucosa moist, no ulcerations  Neck:  Supple, no JVD,   Skin: no breakdowns (as per RN)  Resp: CTA B/L  CV: RRR, S1S2,   GI: Soft, NT, bowel sounds  MS: No edema, + peripheral pulses, FROM all 4 extremity

## 2019-12-14 NOTE — PROGRESS NOTE ADULT - ASSESSMENT
transient global amnesia/tia  etoh abuse  ?withdrawal/wernickes    rehab/pt   neuro fu  ?to neuro icu  continue ativan protocol  continue supp

## 2019-12-14 NOTE — PROGRESS NOTE ADULT - SUBJECTIVE AND OBJECTIVE BOX
SUBJECTIVE:    Patient is a 79y old Female who presents with a chief complaint of AMS (13 Dec 2019 22:26)    Currently admitted to medicine with the primary diagnosis of AMS (altered mental status)     Today is hospital day 3d. This morning she is resting comfortably in bed and reports no new issues or overnight events.     PAST MEDICAL & SURGICAL HISTORY  Transient global amnesia  High cholesterol  HTN (hypertension)  No significant past surgical history    SOCIAL HISTORY:  Negative for smoking/alcohol/drug use.     ALLERGIES:  No Known Drug Allergies  shellfish (Rash; Flushing; Drowsiness)    MEDICATIONS:  STANDING MEDICATIONS  aspirin enteric coated 162 milliGRAM(s) Oral daily  atorvastatin 40 milliGRAM(s) Oral at bedtime  chlorhexidine 4% Liquid 1 Application(s) Topical <User Schedule>  enoxaparin Injectable 40 milliGRAM(s) SubCutaneous daily  folic acid 1 milliGRAM(s) Oral daily  losartan 50 milliGRAM(s) Oral daily  metoprolol succinate ER 50 milliGRAM(s) Oral daily  thiamine 100 milliGRAM(s) Oral daily    PRN MEDICATIONS  LORazepam   Injectable 1 milliGRAM(s) IV Push every 2 hours PRN    VITALS:   T(F): 98.1  HR: 85  BP: 182/88  RR: 18  SpO2: 98%    LABS:                        14.1   8.44  )-----------( 359      ( 14 Dec 2019 06:40 )             41.6     12-14    143  |  103  |  19  ----------------------------<  89  4.6   |  25  |  0.7    Ca    9.5      14 Dec 2019 06:40  Mg     2.0     12-14    TPro  6.7  /  Alb  3.9  /  TBili  0.5  /  DBili  x   /  AST  22  /  ALT  23  /  AlkPhos  76  12-14              Culture - Blood (collected 11 Dec 2019 11:13)  Source: .Blood Blood  Preliminary Report (12 Dec 2019 18:01):    No growth to date.    Culture - Blood (collected 11 Dec 2019 11:13)  Source: .Blood Blood  Preliminary Report (12 Dec 2019 18:01):    No growth to date.          RADIOLOGY:    PHYSICAL EXAM:  GENERAL: Elderly F in NAD, no signs of respiratory distress  HEAD: Atraumatic  NECK: Supple  CHEST/LUNG: Clear to auscultation bilaterally; No wheeze or crackles  HEART: S1, S2; RRR; No murmurs, rubs, or gallops  ABDOMEN: BS+; Soft, Non-tender, Non-distended  EXTREMITIES:  2+ Peripheral Pulses, No clubbing, cyanosis, or edema  NEURO:     MENTAL STATUS: AAOx3    LANG/SPEECH: Fluent, intact naming, repetition & comprehension    CRANIAL NERVES:    II: Pupils equal and reactive, no RAPD, normal visual field and fundus    III, IV, VI: EOM intact, no gaze preference or deviation    V: normal    VII: no facial asymmetry    VIII: normal hearing to speech    MOTOR: 5/5 in both upper and lower extremities    REFLEXES: 2/4 throughout, bilateral flexor planters    SENSORY: Normal to touch, temperature & pin prick in all extremities    COORD: Normal finger to nose and heel to shin, no tremor, no dysmetria  SKIN: No rashes or lesions

## 2019-12-15 LAB
ALBUMIN SERPL ELPH-MCNC: 3.8 G/DL — SIGNIFICANT CHANGE UP (ref 3.5–5.2)
ALP SERPL-CCNC: 72 U/L — SIGNIFICANT CHANGE UP (ref 30–115)
ALT FLD-CCNC: 23 U/L — SIGNIFICANT CHANGE UP (ref 0–41)
ANION GAP SERPL CALC-SCNC: 14 MMOL/L — SIGNIFICANT CHANGE UP (ref 7–14)
AST SERPL-CCNC: 20 U/L — SIGNIFICANT CHANGE UP (ref 0–41)
BASOPHILS # BLD AUTO: 0.07 K/UL — SIGNIFICANT CHANGE UP (ref 0–0.2)
BASOPHILS NFR BLD AUTO: 0.9 % — SIGNIFICANT CHANGE UP (ref 0–1)
BILIRUB SERPL-MCNC: 0.5 MG/DL — SIGNIFICANT CHANGE UP (ref 0.2–1.2)
BUN SERPL-MCNC: 20 MG/DL — SIGNIFICANT CHANGE UP (ref 10–20)
CALCIUM SERPL-MCNC: 9.5 MG/DL — SIGNIFICANT CHANGE UP (ref 8.5–10.1)
CHLORIDE SERPL-SCNC: 104 MMOL/L — SIGNIFICANT CHANGE UP (ref 98–110)
CO2 SERPL-SCNC: 24 MMOL/L — SIGNIFICANT CHANGE UP (ref 17–32)
CREAT SERPL-MCNC: 0.6 MG/DL — LOW (ref 0.7–1.5)
EOSINOPHIL # BLD AUTO: 0.13 K/UL — SIGNIFICANT CHANGE UP (ref 0–0.7)
EOSINOPHIL NFR BLD AUTO: 1.6 % — SIGNIFICANT CHANGE UP (ref 0–8)
GLUCOSE SERPL-MCNC: 103 MG/DL — HIGH (ref 70–99)
HCT VFR BLD CALC: 41.5 % — SIGNIFICANT CHANGE UP (ref 37–47)
HGB BLD-MCNC: 13.8 G/DL — SIGNIFICANT CHANGE UP (ref 12–16)
IMM GRANULOCYTES NFR BLD AUTO: 0.9 % — HIGH (ref 0.1–0.3)
LYMPHOCYTES # BLD AUTO: 2 K/UL — SIGNIFICANT CHANGE UP (ref 1.2–3.4)
LYMPHOCYTES # BLD AUTO: 25.2 % — SIGNIFICANT CHANGE UP (ref 20.5–51.1)
MCHC RBC-ENTMCNC: 30.7 PG — SIGNIFICANT CHANGE UP (ref 27–31)
MCHC RBC-ENTMCNC: 33.3 G/DL — SIGNIFICANT CHANGE UP (ref 32–37)
MCV RBC AUTO: 92.4 FL — SIGNIFICANT CHANGE UP (ref 81–99)
MONOCYTES # BLD AUTO: 1.03 K/UL — HIGH (ref 0.1–0.6)
MONOCYTES NFR BLD AUTO: 13 % — HIGH (ref 1.7–9.3)
NEUTROPHILS # BLD AUTO: 4.63 K/UL — SIGNIFICANT CHANGE UP (ref 1.4–6.5)
NEUTROPHILS NFR BLD AUTO: 58.4 % — SIGNIFICANT CHANGE UP (ref 42.2–75.2)
NRBC # BLD: 0 /100 WBCS — SIGNIFICANT CHANGE UP (ref 0–0)
PLATELET # BLD AUTO: 361 K/UL — SIGNIFICANT CHANGE UP (ref 130–400)
POTASSIUM SERPL-MCNC: 4.1 MMOL/L — SIGNIFICANT CHANGE UP (ref 3.5–5)
POTASSIUM SERPL-SCNC: 4.1 MMOL/L — SIGNIFICANT CHANGE UP (ref 3.5–5)
PROT SERPL-MCNC: 6.4 G/DL — SIGNIFICANT CHANGE UP (ref 6–8)
RBC # BLD: 4.49 M/UL — SIGNIFICANT CHANGE UP (ref 4.2–5.4)
RBC # FLD: 11.4 % — LOW (ref 11.5–14.5)
SODIUM SERPL-SCNC: 142 MMOL/L — SIGNIFICANT CHANGE UP (ref 135–146)
WBC # BLD: 7.93 K/UL — SIGNIFICANT CHANGE UP (ref 4.8–10.8)
WBC # FLD AUTO: 7.93 K/UL — SIGNIFICANT CHANGE UP (ref 4.8–10.8)

## 2019-12-15 RX ADMIN — Medication 162 MILLIGRAM(S): at 11:27

## 2019-12-15 RX ADMIN — CHLORHEXIDINE GLUCONATE 1 APPLICATION(S): 213 SOLUTION TOPICAL at 05:49

## 2019-12-15 RX ADMIN — Medication 100 MILLIGRAM(S): at 11:26

## 2019-12-15 RX ADMIN — ENOXAPARIN SODIUM 40 MILLIGRAM(S): 100 INJECTION SUBCUTANEOUS at 11:27

## 2019-12-15 RX ADMIN — Medication 1 MILLIGRAM(S): at 11:27

## 2019-12-15 RX ADMIN — Medication 50 MILLIGRAM(S): at 05:50

## 2019-12-15 RX ADMIN — ATORVASTATIN CALCIUM 40 MILLIGRAM(S): 80 TABLET, FILM COATED ORAL at 21:59

## 2019-12-15 RX ADMIN — LOSARTAN POTASSIUM 50 MILLIGRAM(S): 100 TABLET, FILM COATED ORAL at 05:50

## 2019-12-15 NOTE — PROGRESS NOTE ADULT - SUBJECTIVE AND OBJECTIVE BOX
Patient was seen and examined. Spoke with RN. Chart reviewed.  No events overnight.  Vital Signs Last 24 Hrs  T(F): 96.4 (15 Dec 2019 05:57), Max: 97.9 (14 Dec 2019 21:24)  HR: 92 (15 Dec 2019 05:57) (81 - 92)  BP: 140/93 (15 Dec 2019 05:57) (118/63 - 141/83)  SpO2: 95% (14 Dec 2019 11:57) (95% - 95%)  MEDICATIONS  (STANDING):  aspirin enteric coated 162 milliGRAM(s) Oral daily  atorvastatin 40 milliGRAM(s) Oral at bedtime  chlorhexidine 4% Liquid 1 Application(s) Topical <User Schedule>  enoxaparin Injectable 40 milliGRAM(s) SubCutaneous daily  folic acid 1 milliGRAM(s) Oral daily  losartan 50 milliGRAM(s) Oral daily  metoprolol succinate ER 50 milliGRAM(s) Oral daily  thiamine 100 milliGRAM(s) Oral daily    MEDICATIONS  (PRN):  LORazepam   Injectable 1 milliGRAM(s) IV Push every 2 hours PRN CIWA-Ar score increase by 2 points and a total score of 7 or less    Labs:                        13.8   7.93  )-----------( 361      ( 15 Dec 2019 07:28 )             41.5                         14.1   8.44  )-----------( 359      ( 14 Dec 2019 06:40 )             41.6     15 Dec 2019 07:28    142    |  104    |  20     ----------------------------<  103    4.1     |  24     |  0.6    14 Dec 2019 06:40    143    |  103    |  19     ----------------------------<  89     4.6     |  25     |  0.7      Ca    9.5        15 Dec 2019 07:28  Ca    9.5        14 Dec 2019 06:40  Mg     2.0       14 Dec 2019 06:40    TPro  6.4    /  Alb  3.8    /  TBili  0.5    /  DBili  x      /  AST  20     /  ALT  23     /  AlkPhos  72     15 Dec 2019 07:28  TPro  6.7    /  Alb  3.9    /  TBili  0.5    /  DBili  x      /  AST  22     /  ALT  23     /  AlkPhos  76     14 Dec 2019 06:40          General: comfortable, NAD  Neurology: A&Ox3, nonfocal  Head:  Normocephalic, atraumatic  ENT:  Mucosa moist, no ulcerations  Neck:  Supple, no JVD,   Skin: no breakdowns (as per RN)  Resp: CTA B/L  CV: RRR, S1S2,   GI: Soft, NT, bowel sounds  MS: No edema, + peripheral pulses, FROM all 4 extremity      A/P:  79 Y F with pmh of 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

## 2019-12-16 LAB
ALBUMIN SERPL ELPH-MCNC: 3.7 G/DL — SIGNIFICANT CHANGE UP (ref 3.5–5.2)
ALP SERPL-CCNC: 73 U/L — SIGNIFICANT CHANGE UP (ref 30–115)
ALT FLD-CCNC: 25 U/L — SIGNIFICANT CHANGE UP (ref 0–41)
ANION GAP SERPL CALC-SCNC: 14 MMOL/L — SIGNIFICANT CHANGE UP (ref 7–14)
AST SERPL-CCNC: 22 U/L — SIGNIFICANT CHANGE UP (ref 0–41)
BASOPHILS # BLD AUTO: 0.04 K/UL — SIGNIFICANT CHANGE UP (ref 0–0.2)
BASOPHILS NFR BLD AUTO: 0.5 % — SIGNIFICANT CHANGE UP (ref 0–1)
BILIRUB SERPL-MCNC: 0.4 MG/DL — SIGNIFICANT CHANGE UP (ref 0.2–1.2)
BUN SERPL-MCNC: 15 MG/DL — SIGNIFICANT CHANGE UP (ref 10–20)
CALCIUM SERPL-MCNC: 9.4 MG/DL — SIGNIFICANT CHANGE UP (ref 8.5–10.1)
CHLORIDE SERPL-SCNC: 105 MMOL/L — SIGNIFICANT CHANGE UP (ref 98–110)
CO2 SERPL-SCNC: 23 MMOL/L — SIGNIFICANT CHANGE UP (ref 17–32)
CREAT SERPL-MCNC: 0.6 MG/DL — LOW (ref 0.7–1.5)
CULTURE RESULTS: SIGNIFICANT CHANGE UP
CULTURE RESULTS: SIGNIFICANT CHANGE UP
EOSINOPHIL # BLD AUTO: 0.15 K/UL — SIGNIFICANT CHANGE UP (ref 0–0.7)
EOSINOPHIL NFR BLD AUTO: 2 % — SIGNIFICANT CHANGE UP (ref 0–8)
GLUCOSE SERPL-MCNC: 101 MG/DL — HIGH (ref 70–99)
HCT VFR BLD CALC: 39.7 % — SIGNIFICANT CHANGE UP (ref 37–47)
HGB BLD-MCNC: 13.4 G/DL — SIGNIFICANT CHANGE UP (ref 12–16)
IMM GRANULOCYTES NFR BLD AUTO: 0.8 % — HIGH (ref 0.1–0.3)
LYMPHOCYTES # BLD AUTO: 2.05 K/UL — SIGNIFICANT CHANGE UP (ref 1.2–3.4)
LYMPHOCYTES # BLD AUTO: 27.5 % — SIGNIFICANT CHANGE UP (ref 20.5–51.1)
MAGNESIUM SERPL-MCNC: 1.9 MG/DL — SIGNIFICANT CHANGE UP (ref 1.8–2.4)
MCHC RBC-ENTMCNC: 30.9 PG — SIGNIFICANT CHANGE UP (ref 27–31)
MCHC RBC-ENTMCNC: 33.8 G/DL — SIGNIFICANT CHANGE UP (ref 32–37)
MCV RBC AUTO: 91.5 FL — SIGNIFICANT CHANGE UP (ref 81–99)
MONOCYTES # BLD AUTO: 0.86 K/UL — HIGH (ref 0.1–0.6)
MONOCYTES NFR BLD AUTO: 11.5 % — HIGH (ref 1.7–9.3)
NEUTROPHILS # BLD AUTO: 4.3 K/UL — SIGNIFICANT CHANGE UP (ref 1.4–6.5)
NEUTROPHILS NFR BLD AUTO: 57.7 % — SIGNIFICANT CHANGE UP (ref 42.2–75.2)
NRBC # BLD: 0 /100 WBCS — SIGNIFICANT CHANGE UP (ref 0–0)
PLATELET # BLD AUTO: 371 K/UL — SIGNIFICANT CHANGE UP (ref 130–400)
POTASSIUM SERPL-MCNC: 4.4 MMOL/L — SIGNIFICANT CHANGE UP (ref 3.5–5)
POTASSIUM SERPL-SCNC: 4.4 MMOL/L — SIGNIFICANT CHANGE UP (ref 3.5–5)
PROT SERPL-MCNC: 6.2 G/DL — SIGNIFICANT CHANGE UP (ref 6–8)
RBC # BLD: 4.34 M/UL — SIGNIFICANT CHANGE UP (ref 4.2–5.4)
RBC # FLD: 11.3 % — LOW (ref 11.5–14.5)
SODIUM SERPL-SCNC: 142 MMOL/L — SIGNIFICANT CHANGE UP (ref 135–146)
SPECIMEN SOURCE: SIGNIFICANT CHANGE UP
SPECIMEN SOURCE: SIGNIFICANT CHANGE UP
WBC # BLD: 7.46 K/UL — SIGNIFICANT CHANGE UP (ref 4.8–10.8)
WBC # FLD AUTO: 7.46 K/UL — SIGNIFICANT CHANGE UP (ref 4.8–10.8)

## 2019-12-16 RX ORDER — FOLIC ACID 0.8 MG
1 TABLET ORAL
Qty: 0 | Refills: 0 | DISCHARGE
Start: 2019-12-16

## 2019-12-16 RX ORDER — METOPROLOL TARTRATE 50 MG
0 TABLET ORAL
Qty: 0 | Refills: 0 | DISCHARGE

## 2019-12-16 RX ORDER — METOPROLOL TARTRATE 50 MG
1 TABLET ORAL
Qty: 0 | Refills: 0 | DISCHARGE
Start: 2019-12-16

## 2019-12-16 RX ORDER — ACETAMINOPHEN 500 MG
650 TABLET ORAL ONCE
Refills: 0 | Status: DISCONTINUED | OUTPATIENT
Start: 2019-12-16 | End: 2019-12-17

## 2019-12-16 RX ORDER — FOLIC ACID 0.8 MG
0 TABLET ORAL
Qty: 0 | Refills: 0 | DISCHARGE

## 2019-12-16 RX ORDER — ASPIRIN/CALCIUM CARB/MAGNESIUM 324 MG
2 TABLET ORAL
Qty: 0 | Refills: 0 | DISCHARGE
Start: 2019-12-16

## 2019-12-16 RX ORDER — ASPIRIN/CALCIUM CARB/MAGNESIUM 324 MG
0 TABLET ORAL
Qty: 0 | Refills: 0 | DISCHARGE

## 2019-12-16 RX ADMIN — ATORVASTATIN CALCIUM 40 MILLIGRAM(S): 80 TABLET, FILM COATED ORAL at 21:10

## 2019-12-16 RX ADMIN — Medication 50 MILLIGRAM(S): at 06:34

## 2019-12-16 RX ADMIN — LOSARTAN POTASSIUM 50 MILLIGRAM(S): 100 TABLET, FILM COATED ORAL at 06:34

## 2019-12-16 RX ADMIN — Medication 1 TABLET(S): at 12:13

## 2019-12-16 RX ADMIN — ENOXAPARIN SODIUM 40 MILLIGRAM(S): 100 INJECTION SUBCUTANEOUS at 12:13

## 2019-12-16 RX ADMIN — Medication 162 MILLIGRAM(S): at 12:13

## 2019-12-16 RX ADMIN — Medication 100 MILLIGRAM(S): at 12:13

## 2019-12-16 RX ADMIN — CHLORHEXIDINE GLUCONATE 1 APPLICATION(S): 213 SOLUTION TOPICAL at 06:34

## 2019-12-16 RX ADMIN — Medication 1 MILLIGRAM(S): at 12:13

## 2019-12-16 NOTE — PROGRESS NOTE ADULT - SUBJECTIVE AND OBJECTIVE BOX
MARTIR POWELL 79y Female  MRN#: [id number]   CODE STATUS: FULL      SUBJECTIVE  Patient is a 79y old Female who presents with a chief complaint of AMS (15 Dec 2019 10:31)    Currently admitted to medicine with the primary diagnosis of TIA/TGA    Today is hospital day 5d,   OVERNIGHT EVENTS: complaining of left sided headache. Gave tylenol and it resolved  This morning she is laying in bed comfortably .   Denies chest pain, shortness of breath, abdominal pain, nausea, vomiting or changes in bowel habits.     Urinating ans stooling appropriately.    Present Today:           James Catheter (x)No/ ()Yes?   Indication:             Central Line (x)No/ ()Yes?   Indication:          IV Fluids (x)No/ ()Yes? Type:  Rate:  Indication:    OBJECTIVE  PAST MEDICAL & SURGICAL HISTORY  Transient global amnesia  High cholesterol  HTN (hypertension)  No significant past surgical history    ALLERGIES:  No Known Drug Allergies  shellfish (Rash; Flushing; Drowsiness)    HOME MEDICATIONS:  Home Medications:  aspirin 81 mg oral tablet, chewable:  (11 Dec 2019 11:29)  folic acid:  (11 Dec 2019 11:29)  irbesartan 150 mg oral tablet:  (11 Dec 2019 11:29)  Metoprolol Tartrate 50 mg oral tablet:  (11 Dec 2019 11:29)  pravastatin 40 mg oral tablet:  (11 Dec 2019 11:29)    MEDICATIONS:  STANDING MEDICATIONS  aspirin enteric coated 162 milliGRAM(s) Oral daily  atorvastatin 40 milliGRAM(s) Oral at bedtime  chlorhexidine 4% Liquid 1 Application(s) Topical <User Schedule>  enoxaparin Injectable 40 milliGRAM(s) SubCutaneous daily  folic acid 1 milliGRAM(s) Oral daily  losartan 50 milliGRAM(s) Oral daily  metoprolol succinate ER 50 milliGRAM(s) Oral daily  multivitamin 1 Tablet(s) Oral daily  thiamine 100 milliGRAM(s) Oral daily    PRN MEDICATIONS  acetaminophen   Tablet .. 650 milliGRAM(s) Oral once PRN  LORazepam   Injectable 1 milliGRAM(s) IV Push every 2 hours PRN      VITAL SIGNS: Last 24 Hours  T(C): 36.3 (16 Dec 2019 06:49), Max: 36.6 (15 Dec 2019 20:31)  T(F): 97.3 (16 Dec 2019 06:49), Max: 97.8 (15 Dec 2019 20:31)  HR: 83 (16 Dec 2019 06:49) (81 - 83)  BP: 138/69 (16 Dec 2019 06:49) (109/67 - 138/69)  RR: 18 (16 Dec 2019 06:49) (18 - 18)    LABS:                        13.4   7.46  )-----------( 371      ( 16 Dec 2019 06:56 )             39.7     12-16    142  |  105  |  15  ----------------------------<  101<H>  4.4   |  23  |  0.6<L>    Ca    9.4      16 Dec 2019 06:56  Mg     1.9     12-16    TPro  6.2  /  Alb  3.7  /  TBili  0.4  /  DBili  x   /  AST  22  /  ALT  25  /  AlkPhos  73  12-16    RADIOLOGY:     MR Head No Cont (12.13.19 @ 17:32) >  Study limited by motion artifact demonstrates no MRI evidence to suggest   acute ischemic change.      CT Head No Cont (12.12.19 @ 18:08) >  No acute intracranial hemorrhage, mass effect, or demarcated territorial   infarct by CT.  Mild white matter microvascular ischemic change.    CT Perfusion w/ Maps w/ IV Cont (12.11.19 @ 13:10) >  1.  No evidence of large vessel occlusion. No definite perfusion   abnormality.    2.  Calcific plaque at the right ICA origin with about 50% stenosis.    3.  Motion artifact somewhat limits evaluation of the intracranial   circulation.    4.  Bilateral palatine tonsillar prominence, correlate clinically for   tonsillitis.    ECHO:  Transthoracic Echocardiogram:    EXAM:  2-D ECHO (TTE) COMPLETE        PROCEDURE DATE:  12/12/2019      INTERPRETATION:  REPORT:    TRANSTHORACIC ECHOCARDIOGRAM REPORT      Patient Name:   MARTIR POWELL Accession #: [id number]  Medical Rec #:  [id number]       Height:      65.0 in 165.1 cm  YOB: 1940     Weight:      150.0 lb 68.04 kg  Patient Age:    79 years       BSA:         1.75 m²  Patient Gender: F              BP:          177/85 mmHg       Date of Exam:        12/12/2019 12:34:24 PM  Referring Physician: [id number] PAPITO VAZQUEZ  Sonographer:         Ericka Hoskins  Reading Physician:   Shreyas Bansal M.D.    Procedure:   2D Echo/Doppler/Color Doppler Complete and Saline Contrast   (Bubble               Study).  Indications: R55 - Syncope and Collapse  Diagnosis:   Syncope and collapse - R55         Summary:   1. LV Ejection Fraction by Ramirez's Method with a biplane EF of 67 %.   2. Mildly increased LV wall thickness.   3. Spectral Doppler shows impaired relaxation pattern of left   ventricular myocardial filling (Grade I diastolic dysfunction).   4. Color flow doppler and intravenous injection of agitated saline   demonstrates the presence of an intact intra atrial septum.    PHYSICIAN INTERPRETATION:  Left Ventricle: The left ventricular internal cavity size is normal. Left   ventricular wall thickness is mildly increased. Spectral Doppler shows   impaired relaxation pattern of left ventricular myocardial filling (Grade   I diastolic dysfunction).  Right Ventricle: Normal right ventricular size and function.  Left Atrium: Normal left atrial size. Color flow doppler and intravenous   injection of agitated saline demonstrates the presence of an intact intra   atrial septum.  Right Atrium: Normal right atrial size.  Pericardium: There is no evidence of pericardial effusion.  Mitral Valve: Structurally normal mitral valve, with normal leaflet   excursion. The mitral valve is normal in structure. No evidence of mitral   valve regurgitation is seen.  Tricuspid Valve: Structurally normal tricuspid valve, with normal leaflet   excursion. The tricuspid valve is normal in structure. Trivial tricuspid   regurgitation is visualized.  Aortic Valve: Normal trileaflet aortic valve with normal opening. The   aortic valve is normal. No evidence of aortic valve regurgitation is seen.  Pulmonic Valve: Structurally normal pulmonic valve, with normal leaflet   excursion. The pulmonic valve is normal. No indication of pulmonic valve   regurgitation.  Aorta: The aortic root and ascending aorta are structurally normal, with   no evidence of dilitation.  Pulmonary Artery: The main pulmonary artery is normal in size.  Shunts: Agitated saline contrast was given intravenously to evaluate for   intracardiac shunting.       2D AND M-MODE MEASUREMENTS (normal ranges within parentheses):  Left                  Normal   Aorta/Left             Normal  Ventricle:                     Atrium:  IVSd (2D):  0.68 cm  (0.7-1.1) AoV Cusp       1.78  (1.5-2.6)  LVPWd       0.94 cm  (0.7-1.1) Separation:     cm  (2D):                          Left Atrium    2.95  (1.9-4.0)  LVIDd       3.71 cm  (3.4-5.7) (Mmode):        cm  (2D):                          Right  LVIDs       2.14 cm            Ventricle:  (2D):                          RVd (2D):    3.02 cm  LV FS       42.5 %    (>25%)  (2D):  IVSd        1.13 cm  (0.7-1.1)  (Mmode):  LVPWd       0.87 cm  (0.7-1.1)  (Mmode):  LVIDd       3.80 cm  (3.4-5.7)  (Mmode):  LVIDs       2.51 cm  (Mmode):  LV FS       33.9 %    (>25%)  (Mmode):  Relative     0.51     (<0.42)  Wall  Thickness  Rel. Wall    0.46     (<0.42)  Thickness  Mm  LV Mass    66.6 g/m²  Index:  Mmode    SPECTRAL DOPPLER ANALYSIS:  LV DIASTOLIC FUNCTION:  MV Peak E: 0.54 m/s Decel Time: 185 msec  MV Peak A: 0.98 m/s  E/A Ratio: 0.55    Aortic Valve:  AoV VMax:    1.11 m/s AoV Area, Vmax: 2.59 cm² Vmax Indx: 1.48 cm²/m²  AoV Pk Grad: 5.0 mmHg    LVOT Vmax: 1.04 m/s  LVOT VTI:  0.22 m  LVOT Diam: 1.88 cm    Mitral Valve:  MV P1/2 Time: 53.73 msec  MV Area, PHT: 4.09 cm²    Tricuspid Valve and PA/RV Systolic Pressure: TR Max Velocity: 2.09 m/s RA   Pressure: 5 mmHg RVSP/PASP: 22.5 mmHg       [id number] Shreyas Bansal M.D., Electronically signed on 12/12/2019 at 2:55:22   PM     *** Final ***  SHREYAS BANSAL MD  This document has been electronically signed. Dec 12 2019 12:34PM(12-12-19 @ 12:34)      PHYSICAL EXAM:    GENERAL: NAD, well-developed, AAOx3  HEENT:  Atraumatic, Normocephalic. EOMI, PERRLA, conjunctiva and sclera clear, No JVD  PULMONARY: Clear to auscultation bilaterally; No wheeze  CARDIOVASCULAR: Regular rate and rhythm; No murmurs, rubs, or gallops  GASTROINTESTINAL: Soft, Nontender, Nondistended; Bowel sounds present  MUSCULOSKELETAL:  2+ Peripheral Pulses, No clubbing, cyanosis, or edema  NEUROLOGY: non-focal  SKIN: No rashes or lesions    ASSESSMENT & PLAN  79 Y F with pmh of transient global ischemia (8-9 years ago), HTN, DLD presents to ED after being found by sister and daughter this morning with altered mental status. Stroke code was called, NIHSS 7, no TPA given, imaging so far negative.     # AMS - NIHSS 7 on admission.likely due to   - CT head/perfusion studies negative so far  - MRI shows no acute infarct  - TTE: EF = 67%, demonstrates the presence of an intact intra atrial septum.  - EEG: generalized slowing   - Neuro exam: non focal  - f/u PT/rehab/OT.   - Continue asa and statin  - Neuro - TIA/TGA d/c from stroke    # HTN  - c/w Losartan and Metoprolol tartrate    # Thiamine and folate deficiency secondary to EtOH abuse  - CIWA protocol PRN  - due to prolonged EtOH use  - c/w Supplement    Diet - DASH  DVT PPx - SCD  GI ppx - not indicated  Dispo - From home, lives by herself, possible need social eval   FULL CODE  Pending: PT/rehab/OT MARTIR POWELL 79y Female  MRN#: [id number]   CODE STATUS: FULL      SUBJECTIVE  Patient is a 79y old Female who presents with a chief complaint of AMS (15 Dec 2019 10:31)    Currently admitted to medicine with the primary diagnosis of TIA/TGA    Today is hospital day 5d,   OVERNIGHT EVENTS: complaining of left sided headache. Gave tylenol and it resolved  This morning she is laying in bed comfortably .   Denies chest pain, shortness of breath, abdominal pain, nausea, vomiting or changes in bowel habits.       Present Today:           James Catheter (x)No/ ()Yes?   Indication:             Central Line (x)No/ ()Yes?   Indication:          IV Fluids (x)No/ ()Yes? Type:  Rate:  Indication:    OBJECTIVE  PAST MEDICAL & SURGICAL HISTORY  Transient global amnesia  High cholesterol  HTN (hypertension)  No significant past surgical history    ALLERGIES:  No Known Drug Allergies  shellfish (Rash; Flushing; Drowsiness)    HOME MEDICATIONS:  Home Medications:  aspirin 81 mg oral tablet, chewable:  (11 Dec 2019 11:29)  folic acid:  (11 Dec 2019 11:29)  irbesartan 150 mg oral tablet:  (11 Dec 2019 11:29)  Metoprolol Tartrate 50 mg oral tablet:  (11 Dec 2019 11:29)  pravastatin 40 mg oral tablet:  (11 Dec 2019 11:29)    MEDICATIONS:  STANDING MEDICATIONS  aspirin enteric coated 162 milliGRAM(s) Oral daily  atorvastatin 40 milliGRAM(s) Oral at bedtime  chlorhexidine 4% Liquid 1 Application(s) Topical <User Schedule>  enoxaparin Injectable 40 milliGRAM(s) SubCutaneous daily  folic acid 1 milliGRAM(s) Oral daily  losartan 50 milliGRAM(s) Oral daily  metoprolol succinate ER 50 milliGRAM(s) Oral daily  multivitamin 1 Tablet(s) Oral daily  thiamine 100 milliGRAM(s) Oral daily    PRN MEDICATIONS  acetaminophen   Tablet .. 650 milliGRAM(s) Oral once PRN  LORazepam   Injectable 1 milliGRAM(s) IV Push every 2 hours PRN      VITAL SIGNS: Last 24 Hours  T(C): 36.3 (16 Dec 2019 06:49), Max: 36.6 (15 Dec 2019 20:31)  T(F): 97.3 (16 Dec 2019 06:49), Max: 97.8 (15 Dec 2019 20:31)  HR: 83 (16 Dec 2019 06:49) (81 - 83)  BP: 138/69 (16 Dec 2019 06:49) (109/67 - 138/69)  RR: 18 (16 Dec 2019 06:49) (18 - 18)    LABS:                        13.4   7.46  )-----------( 371      ( 16 Dec 2019 06:56 )             39.7     12-16    142  |  105  |  15  ----------------------------<  101<H>  4.4   |  23  |  0.6<L>    Ca    9.4      16 Dec 2019 06:56  Mg     1.9     12-16    TPro  6.2  /  Alb  3.7  /  TBili  0.4  /  DBili  x   /  AST  22  /  ALT  25  /  AlkPhos  73  12-16    RADIOLOGY:     MR Head No Cont (12.13.19 @ 17:32) >  Study limited by motion artifact demonstrates no MRI evidence to suggest   acute ischemic change.      CT Head No Cont (12.12.19 @ 18:08) >  No acute intracranial hemorrhage, mass effect, or demarcated territorial   infarct by CT.  Mild white matter microvascular ischemic change.    CT Perfusion w/ Maps w/ IV Cont (12.11.19 @ 13:10) >  1.  No evidence of large vessel occlusion. No definite perfusion   abnormality.    2.  Calcific plaque at the right ICA origin with about 50% stenosis.    3.  Motion artifact somewhat limits evaluation of the intracranial   circulation.    4.  Bilateral palatine tonsillar prominence, correlate clinically for   tonsillitis.    ECHO:  Transthoracic Echocardiogram:    EXAM:  2-D ECHO (TTE) COMPLETE        PROCEDURE DATE:  12/12/2019      INTERPRETATION:  REPORT:    TRANSTHORACIC ECHOCARDIOGRAM REPORT      Patient Name:   MARTIR POWELL Accession #: [id number]  Medical Rec #:  [id number]       Height:      65.0 in 165.1 cm  YOB: 1940     Weight:      150.0 lb 68.04 kg  Patient Age:    79 years       BSA:         1.75 m²  Patient Gender: F              BP:          177/85 mmHg       Date of Exam:        12/12/2019 12:34:24 PM  Referring Physician: [id number] PAPITO VAZQUEZ  Sonographer:         Ericka Hoskins  Reading Physician:   Shreyas Bansal M.D.    Procedure:   2D Echo/Doppler/Color Doppler Complete and Saline Contrast   (Bubble               Study).  Indications: R55 - Syncope and Collapse  Diagnosis:   Syncope and collapse - R55    Summary:   1. LV Ejection Fraction by Ramirez's Method with a biplane EF of 67 %.   2. Mildly increased LV wall thickness.   3. Spectral Doppler shows impaired relaxation pattern of left   ventricular myocardial filling (Grade I diastolic dysfunction).   4. Color flow doppler and intravenous injection of agitated saline   demonstrates the presence of an intact intra atrial septum.    PHYSICIAN INTERPRETATION:  Left Ventricle: The left ventricular internal cavity size is normal. Left   ventricular wall thickness is mildly increased. Spectral Doppler shows   impaired relaxation pattern of left ventricular myocardial filling (Grade   I diastolic dysfunction).  Right Ventricle: Normal right ventricular size and function.  Left Atrium: Normal left atrial size. Color flow doppler and intravenous   injection of agitated saline demonstrates the presence of an intact intra   atrial septum.  Right Atrium: Normal right atrial size.  Pericardium: There is no evidence of pericardial effusion.  Mitral Valve: Structurally normal mitral valve, with normal leaflet   excursion. The mitral valve is normal in structure. No evidence of mitral   valve regurgitation is seen.  Tricuspid Valve: Structurally normal tricuspid valve, with normal leaflet   excursion. The tricuspid valve is normal in structure. Trivial tricuspid   regurgitation is visualized.  Aortic Valve: Normal trileaflet aortic valve with normal opening. The   aortic valve is normal. No evidence of aortic valve regurgitation is seen.  Pulmonic Valve: Structurally normal pulmonic valve, with normal leaflet   excursion. The pulmonic valve is normal. No indication of pulmonic valve   regurgitation.  Aorta: The aortic root and ascending aorta are structurally normal, with   no evidence of dilitation.  Pulmonary Artery: The main pulmonary artery is normal in size.  Shunts: Agitated saline contrast was given intravenously to evaluate for   intracardiac shunting.       2D AND M-MODE MEASUREMENTS (normal ranges within parentheses):  Left                  Normal   Aorta/Left             Normal  Ventricle:                     Atrium:  IVSd (2D):  0.68 cm  (0.7-1.1) AoV Cusp       1.78  (1.5-2.6)  LVPWd       0.94 cm  (0.7-1.1) Separation:     cm  (2D):                          Left Atrium    2.95  (1.9-4.0)  LVIDd       3.71 cm  (3.4-5.7) (Mmode):        cm  (2D):                          Right  LVIDs       2.14 cm            Ventricle:  (2D):                          RVd (2D):    3.02 cm  LV FS       42.5 %    (>25%)  (2D):  IVSd        1.13 cm  (0.7-1.1)  (Mmode):  LVPWd       0.87 cm  (0.7-1.1)  (Mmode):  LVIDd       3.80 cm  (3.4-5.7)  (Mmode):  LVIDs       2.51 cm  (Mmode):  LV FS       33.9 %    (>25%)  (Mmode):  Relative     0.51     (<0.42)  Wall  Thickness  Rel. Wall    0.46     (<0.42)  Thickness  Mm  LV Mass    66.6 g/m²  Index:  Mmode    SPECTRAL DOPPLER ANALYSIS:  LV DIASTOLIC FUNCTION:  MV Peak E: 0.54 m/s Decel Time: 185 msec  MV Peak A: 0.98 m/s  E/A Ratio: 0.55    Aortic Valve:  AoV VMax:    1.11 m/s AoV Area, Vmax: 2.59 cm² Vmax Indx: 1.48 cm²/m²  AoV Pk Grad: 5.0 mmHg    LVOT Vmax: 1.04 m/s  LVOT VTI:  0.22 m  LVOT Diam: 1.88 cm    Mitral Valve:  MV P1/2 Time: 53.73 msec  MV Area, PHT: 4.09 cm²    Tricuspid Valve and PA/RV Systolic Pressure: TR Max Velocity: 2.09 m/s RA   Pressure: 5 mmHg RVSP/PASP: 22.5 mmHg       [id number] Shreyas Bansal M.D., Electronically signed on 12/12/2019 at 2:55:22   PM     *** Final ***  SHREYAS BANSAL MD  This document has been electronically signed. Dec 12 2019 12:34PM(12-12-19 @ 12:34)      PHYSICAL EXAM:    GENERAL: NAD, well-developed, AAOx3  HEENT:  Atraumatic, Normocephalic. EOMI, PERRLA, conjunctiva and sclera clear, No JVD  PULMONARY: Clear to auscultation bilaterally; No wheeze  CARDIOVASCULAR: Regular rate and rhythm; No murmurs, rubs, or gallops  GASTROINTESTINAL: Soft, Nontender, Nondistended; Bowel sounds present  MUSCULOSKELETAL:  2+ Peripheral Pulses, No clubbing, cyanosis, or edema  NEUROLOGY: non-focal  SKIN: No rashes or lesions    ASSESSMENT & PLAN  79 Y F with pmh of transient global ischemia (8-9 years ago), HTN, DLD presents to ED after being found by sister and daughter this morning with altered mental status. Stroke code was called, NIHSS 7, no TPA given, imaging so far negative.     # AMS - NIHSS 7 on admission likely due to TIA  - CT head/perfusion studies negative so far  - MRI shows no acute infarct  - TTE: EF = 67%, demonstrates the presence of an intact intra atrial septum.  - EEG: generalized slowing   - Neuro exam: non focal  - f/u PT/rehab/OT.   - Continue asa and statin  - Neuro - TIA/TGA d/c from stroke  - Worked with PT today  - Physiatry has seen her today , stable for discahrge tomorrow    # HTN  - c/w Losartan and Metoprolol tartrate    # Thiamine and folate deficiency secondary to EtOH abuse  - CIWA protocol PRN  - due to prolonged EtOH use  - c/w Supplement    Diet - DASH  DVT PPx - SCD  GI ppx - not indicated  Dispo - From home, lives by herself, possible need social eval   FULL CODE  Anticipated for discharge tomorrow Statement Selected

## 2019-12-16 NOTE — PROGRESS NOTE ADULT - SUBJECTIVE AND OBJECTIVE BOX
She is amb indep without any assistive device.  She is back to normal.  She is quite active and plays tennis and golf regularly.  There is no need for a full PM&R consult at this time.  Impression: TIA.  from rehab. perspective stable for discharge home as planned tomorrow.  Patient seen amb with PT very nicely. Full indep.

## 2019-12-16 NOTE — PHYSICAL THERAPY INITIAL EVALUATION ADULT - GENERAL OBSERVATIONS, REHAB EVAL
chart reviewed - pt currently at echo - PT will follow up
[id number] pm. patient rec'd/left in bed, nad. upon d/c tomorrow, patient is going to stay in her son's house (1st level, no stairs if entering through the back)

## 2019-12-16 NOTE — PROGRESS NOTE ADULT - SUBJECTIVE AND OBJECTIVE BOX
Patient was seen and examined. Spoke with RN. Chart reviewed.  feels better,  family at bedside  dc planning  d/w ho    No events overnight.  Vital Signs Last 24 Hrs  T(F): 97.7 (16 Dec 2019 14:36), Max: 97.8 (15 Dec 2019 20:31)  HR: 81 (16 Dec 2019 14:36) (81 - 83)  BP: 150/70 (16 Dec 2019 14:36) (120/64 - 150/70)  SpO2: --  MEDICATIONS  (STANDING):  aspirin enteric coated 162 milliGRAM(s) Oral daily  atorvastatin 40 milliGRAM(s) Oral at bedtime  chlorhexidine 4% Liquid 1 Application(s) Topical <User Schedule>  enoxaparin Injectable 40 milliGRAM(s) SubCutaneous daily  folic acid 1 milliGRAM(s) Oral daily  losartan 50 milliGRAM(s) Oral daily  metoprolol succinate ER 50 milliGRAM(s) Oral daily  multivitamin 1 Tablet(s) Oral daily  thiamine 100 milliGRAM(s) Oral daily    MEDICATIONS  (PRN):  acetaminophen   Tablet .. 650 milliGRAM(s) Oral once PRN Moderate Pain (4 - 6)  LORazepam   Injectable 1 milliGRAM(s) IV Push every 2 hours PRN CIWA-Ar score increase by 2 points and a total score of 7 or less    Labs:                        13.4   7.46  )-----------( 371      ( 16 Dec 2019 06:56 )             39.7                         13.8   7.93  )-----------( 361      ( 15 Dec 2019 07:28 )             41.5     16 Dec 2019 06:56    142    |  105    |  15     ----------------------------<  101    4.4     |  23     |  0.6    15 Dec 2019 07:28    142    |  104    |  20     ----------------------------<  103    4.1     |  24     |  0.6      Ca    9.4        16 Dec 2019 06:56  Ca    9.5        15 Dec 2019 07:28  Mg     1.9       16 Dec 2019 06:56    TPro  6.2    /  Alb  3.7    /  TBili  0.4    /  DBili  x      /  AST  22     /  ALT  25     /  AlkPhos  73     16 Dec 2019 06:56  TPro  6.4    /  Alb  3.8    /  TBili  0.5    /  DBili  x      /  AST  20     /  ALT  23     /  AlkPhos  72     15 Dec 2019 07:28            Radiology:    General: comfortable, NAD  Neurology: A&Ox3, nonfocal  Head:  Normocephalic, atraumatic  ENT:  Mucosa moist, no ulcerations  Neck:  Supple, no JVD,   Skin: no breakdowns (as per RN)  Resp: CTA B/L  CV: RRR, S1S2,   GI: Soft, NT, bowel sounds  MS: No edema, + peripheral pulses, FROM all 4 extremity

## 2019-12-17 ENCOUNTER — TRANSCRIPTION ENCOUNTER (OUTPATIENT)
Age: 79
End: 2019-12-17

## 2019-12-17 VITALS
TEMPERATURE: 98 F | SYSTOLIC BLOOD PRESSURE: 159 MMHG | RESPIRATION RATE: 18 BRPM | DIASTOLIC BLOOD PRESSURE: 82 MMHG | HEART RATE: 79 BPM

## 2019-12-17 LAB
ALBUMIN SERPL ELPH-MCNC: 3.8 G/DL — SIGNIFICANT CHANGE UP (ref 3.5–5.2)
ALP SERPL-CCNC: 74 U/L — SIGNIFICANT CHANGE UP (ref 30–115)
ALT FLD-CCNC: 47 U/L — HIGH (ref 0–41)
ANION GAP SERPL CALC-SCNC: 11 MMOL/L — SIGNIFICANT CHANGE UP (ref 7–14)
AST SERPL-CCNC: 41 U/L — SIGNIFICANT CHANGE UP (ref 0–41)
BASOPHILS # BLD AUTO: 0.04 K/UL — SIGNIFICANT CHANGE UP (ref 0–0.2)
BASOPHILS NFR BLD AUTO: 0.6 % — SIGNIFICANT CHANGE UP (ref 0–1)
BILIRUB SERPL-MCNC: 0.5 MG/DL — SIGNIFICANT CHANGE UP (ref 0.2–1.2)
BUN SERPL-MCNC: 16 MG/DL — SIGNIFICANT CHANGE UP (ref 10–20)
CALCIUM SERPL-MCNC: 9.5 MG/DL — SIGNIFICANT CHANGE UP (ref 8.5–10.1)
CHLORIDE SERPL-SCNC: 108 MMOL/L — SIGNIFICANT CHANGE UP (ref 98–110)
CO2 SERPL-SCNC: 25 MMOL/L — SIGNIFICANT CHANGE UP (ref 17–32)
CREAT SERPL-MCNC: 0.6 MG/DL — LOW (ref 0.7–1.5)
EOSINOPHIL # BLD AUTO: 0.13 K/UL — SIGNIFICANT CHANGE UP (ref 0–0.7)
EOSINOPHIL NFR BLD AUTO: 2 % — SIGNIFICANT CHANGE UP (ref 0–8)
GLUCOSE SERPL-MCNC: 91 MG/DL — SIGNIFICANT CHANGE UP (ref 70–99)
HCT VFR BLD CALC: 39.4 % — SIGNIFICANT CHANGE UP (ref 37–47)
HGB BLD-MCNC: 13.3 G/DL — SIGNIFICANT CHANGE UP (ref 12–16)
IMM GRANULOCYTES NFR BLD AUTO: 0.6 % — HIGH (ref 0.1–0.3)
LYMPHOCYTES # BLD AUTO: 2.21 K/UL — SIGNIFICANT CHANGE UP (ref 1.2–3.4)
LYMPHOCYTES # BLD AUTO: 33.3 % — SIGNIFICANT CHANGE UP (ref 20.5–51.1)
MAGNESIUM SERPL-MCNC: 2 MG/DL — SIGNIFICANT CHANGE UP (ref 1.8–2.4)
MCHC RBC-ENTMCNC: 31.2 PG — HIGH (ref 27–31)
MCHC RBC-ENTMCNC: 33.8 G/DL — SIGNIFICANT CHANGE UP (ref 32–37)
MCV RBC AUTO: 92.5 FL — SIGNIFICANT CHANGE UP (ref 81–99)
MONOCYTES # BLD AUTO: 0.81 K/UL — HIGH (ref 0.1–0.6)
MONOCYTES NFR BLD AUTO: 12.2 % — HIGH (ref 1.7–9.3)
NEUTROPHILS # BLD AUTO: 3.41 K/UL — SIGNIFICANT CHANGE UP (ref 1.4–6.5)
NEUTROPHILS NFR BLD AUTO: 51.3 % — SIGNIFICANT CHANGE UP (ref 42.2–75.2)
NRBC # BLD: 0 /100 WBCS — SIGNIFICANT CHANGE UP (ref 0–0)
PLATELET # BLD AUTO: 360 K/UL — SIGNIFICANT CHANGE UP (ref 130–400)
POTASSIUM SERPL-MCNC: 4.6 MMOL/L — SIGNIFICANT CHANGE UP (ref 3.5–5)
POTASSIUM SERPL-SCNC: 4.6 MMOL/L — SIGNIFICANT CHANGE UP (ref 3.5–5)
PROT SERPL-MCNC: 6.2 G/DL — SIGNIFICANT CHANGE UP (ref 6–8)
RBC # BLD: 4.26 M/UL — SIGNIFICANT CHANGE UP (ref 4.2–5.4)
RBC # FLD: 11.4 % — LOW (ref 11.5–14.5)
SODIUM SERPL-SCNC: 144 MMOL/L — SIGNIFICANT CHANGE UP (ref 135–146)
WBC # BLD: 6.64 K/UL — SIGNIFICANT CHANGE UP (ref 4.8–10.8)
WBC # FLD AUTO: 6.64 K/UL — SIGNIFICANT CHANGE UP (ref 4.8–10.8)

## 2019-12-17 RX ORDER — THIAMINE MONONITRATE (VIT B1) 100 MG
1 TABLET ORAL
Qty: 30 | Refills: 0
Start: 2019-12-17 | End: 2020-01-15

## 2019-12-17 RX ADMIN — Medication 100 MILLIGRAM(S): at 11:35

## 2019-12-17 RX ADMIN — Medication 50 MILLIGRAM(S): at 05:21

## 2019-12-17 RX ADMIN — CHLORHEXIDINE GLUCONATE 1 APPLICATION(S): 213 SOLUTION TOPICAL at 05:21

## 2019-12-17 RX ADMIN — Medication 1 MILLIGRAM(S): at 11:35

## 2019-12-17 RX ADMIN — Medication 1 TABLET(S): at 11:35

## 2019-12-17 RX ADMIN — Medication 162 MILLIGRAM(S): at 11:36

## 2019-12-17 RX ADMIN — LOSARTAN POTASSIUM 50 MILLIGRAM(S): 100 TABLET, FILM COATED ORAL at 05:21

## 2019-12-17 NOTE — DISCHARGE NOTE NURSING/CASE MANAGEMENT/SOCIAL WORK - PATIENT PORTAL LINK FT
You can access the FollowMyHealth Patient Portal offered by [facility] by registering at the following website: [email or web address]. By joining [employer] [employer]’s FollowMyHealth portal, you will also be able to view your health information using other applications (apps) compatible with our system.

## 2019-12-17 NOTE — CHART NOTE - NSCHARTNOTEFT_GEN_A_CORE
<<<RESIDENT DISCHARGE NOTE>>>     MARTIR POWELL  MRN-[id number]    VITAL SIGNS:  T(F): 97.5 (12-17-19 @ 05:11), Max: 97.7 (12-16-19 @ 14:36)  HR: 79 (12-17-19 @ 05:11)  BP: 159/82 (12-17-19 @ 05:11)      PHYSICAL EXAMINATION:  GENERAL: NAD, well-developed, AAOx3  HEENT:  Atraumatic, Normocephalic. EOMI, PERRLA, conjunctiva and sclera clear, No JVD  PULMONARY: Clear to auscultation bilaterally; No wheeze  CARDIOVASCULAR: Regular rate and rhythm; No murmurs, rubs, or gallops  GASTROINTESTINAL: Soft, Nontender, Nondistended; Bowel sounds present  MUSCULOSKELETAL:  2+ Peripheral Pulses, No clubbing, cyanosis, or edema  NEUROLOGY: non-focal  SKIN: No rashes or lesions    TEST RESULTS:                        13.3   6.64  )-----------( 360      ( 17 Dec 2019 06:08 )             39.4       12-17    144  |  108  |  16  ----------------------------<  91  4.6   |  25  |  0.6<L>    Ca    9.5      17 Dec 2019 06:08  Mg     2.0     12-17    TPro  6.2  /  Alb  3.8  /  TBili  0.5  /  DBili  x   /  AST  41  /  ALT  47<H>  /  AlkPhos  74  12-17      FINAL DISCHARGE INTERVIEW:  Resident(s) Present: (Name:_Dr. Montes____________), RN Present: (Name:  Pily___________)    DISCHARGE MEDICATION RECONCILIATION  reviewed with Attending (Name:_Dr. Starkey__________)    DISPOSITION:   [ x ] Home,    [  ] Home with Visiting Nursing Services,   [    ]  SNF/ NH,    [   ] Acute Rehab (4A),   [   ] Other (Specify:_________)

## 2019-12-20 DIAGNOSIS — R33.9 RETENTION OF URINE, UNSPECIFIED: ICD-10-CM

## 2019-12-20 DIAGNOSIS — E78.5 HYPERLIPIDEMIA, UNSPECIFIED: ICD-10-CM
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
## 2019-12-20 DIAGNOSIS — Z87.440 PERSONAL HISTORY OF URINARY (TRACT) INFECTIONS: ICD-10-CM

## 2019-12-20 DIAGNOSIS — E53.8 DEFICIENCY OF OTHER SPECIFIED B GROUP VITAMINS: ICD-10-CM

## 2019-12-20 DIAGNOSIS — R41.82 ALTERED MENTAL STATUS, UNSPECIFIED: ICD-10-CM

## 2019-12-20 DIAGNOSIS — Z87.891 PERSONAL HISTORY OF NICOTINE DEPENDENCE: ICD-10-CM

## 2019-12-20 DIAGNOSIS — R47.01 APHASIA: ICD-10-CM

## 2019-12-20 DIAGNOSIS — E51.9 THIAMINE DEFICIENCY, UNSPECIFIED: ICD-10-CM

## 2019-12-20 DIAGNOSIS — R45.1 RESTLESSNESS AND AGITATION: ICD-10-CM

## 2019-12-20 DIAGNOSIS — I10 ESSENTIAL (PRIMARY) HYPERTENSION: ICD-10-CM

## 2019-12-20 DIAGNOSIS — F10.10 ALCOHOL ABUSE, UNCOMPLICATED: ICD-10-CM

## 2019-12-20 DIAGNOSIS — Z86.19 PERSONAL HISTORY OF OTHER INFECTIOUS AND PARASITIC DISEASES: ICD-10-CM

## 2019-12-20 DIAGNOSIS — G93.40 ENCEPHALOPATHY, UNSPECIFIED: ICD-10-CM

## 2019-12-20 DIAGNOSIS — G45.9 TRANSIENT CEREBRAL ISCHEMIC ATTACK, UNSPECIFIED: ICD-10-CM

## 2020-01-03 ENCOUNTER — APPOINTMENT (OUTPATIENT)
Dept: NEUROLOGY | Facility: CLINIC | Age: 80
End: 2020-01-03

## 2020-01-09 ENCOUNTER — APPOINTMENT (OUTPATIENT)
Dept: CARDIOLOGY | Facility: CLINIC | Age: 80
End: 2020-01-09
Payer: MEDICARE

## 2020-01-09 PROCEDURE — 0296T: CPT

## 2020-03-18 ENCOUNTER — APPOINTMENT (OUTPATIENT)
Dept: CARDIOLOGY | Facility: CLINIC | Age: 80
End: 2020-03-18

## 2020-03-19 ENCOUNTER — APPOINTMENT (OUTPATIENT)
Dept: CARDIOLOGY | Facility: CLINIC | Age: 80
End: 2020-03-19

## 2020-06-09 ENCOUNTER — APPOINTMENT (OUTPATIENT)
Dept: NEUROLOGY | Facility: CLINIC | Age: 80
End: 2020-06-09
Payer: MEDICARE

## 2020-06-09 VITALS
TEMPERATURE: 98.1 F | DIASTOLIC BLOOD PRESSURE: 76 MMHG | BODY MASS INDEX: 26.31 KG/M2 | SYSTOLIC BLOOD PRESSURE: 129 MMHG | HEIGHT: 62 IN | OXYGEN SATURATION: 100 % | WEIGHT: 143 LBS | HEART RATE: 85 BPM

## 2020-06-09 DIAGNOSIS — R56.9 UNSPECIFIED CONVULSIONS: ICD-10-CM

## 2020-06-09 PROCEDURE — 99213 OFFICE O/P EST LOW 20 MIN: CPT

## 2020-06-10 NOTE — PHYSICAL EXAM
[FreeTextEntry1] : Neurologic Examination:\par NAD. AOx3.  Intact memory.  Speech fluent, nondysarthric.  CN 2 – 12 normal.  \par Strength 5/5 b/l UE/LE.  NL tone, bulk. No abnl movements.  DTRs 2+ throughout.  Plantar response downgoing b/l.  (-) Hoffmans, clonus.  Sensory intact LT/PP, pain, temp, proprioception and vibration.  NL FTN/HKS.  No dysdiadokinesia.  Gait narrow based/NL tandem.  \par \par

## 2020-06-10 NOTE — ASSESSMENT
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  Patient was not feeling well the week leading up to this event. Daughter became concerned when her mother did not answer her phone and went to check on her.  Patient was unable to answer questions, did not follow commands.  Patient has no memory of this.  Her next memory is two days later when her son was in the hospital.    Patient was evaluated for stroke.\par \par Plan:\par 1. Continue stroke prevention medications, Aspirin 81 mg, Irbesertan 150 mg, pravastatin 40 mg daily, metoprolol 50 mg\par 2. VEEG\par 3. Return in 2-3 months\par

## 2020-06-10 NOTE — HISTORY OF PRESENT ILLNESS
[FreeTextEntry1] : Patient is a 79 year old woman with a history of hypertension, DLD, prior transient global amnesia who initially presented to [facility] 12 2/2019 with altered mental status.  At [facility], patient was evaluated for a stroke.  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  Patient was not feeling well, had a fever and cold the week leading up to this event. Daughter became concerned when her mother did not answer her phone and went to patient home to check on her.  Daughter found patient on her couch.  Patient unable to answer questions and did not follow commands.  Patient has no memory of the events at home or going to the hospital.  Her next memory is two days later when she saw her son at the hospital visiting her.

## 2020-06-14 ENCOUNTER — LABORATORY RESULT (OUTPATIENT)
Age: 80
End: 2020-06-14

## 2020-06-16 LAB — SARS-COV-2 N GENE NPH QL NAA+PROBE: NOT DETECTED

## 2020-06-17 ENCOUNTER — INPATIENT (INPATIENT)
Facility: HOSPITAL | Age: 80
LOS: 1 days | Discharge: HOME | End: 2020-06-19
Attending: INTERNAL MEDICINE | Admitting: INTERNAL MEDICINE
Payer: MEDICARE

## 2020-06-17 VITALS
HEIGHT: 62 IN | WEIGHT: 147.49 LBS | SYSTOLIC BLOOD PRESSURE: 168 MMHG | RESPIRATION RATE: 16 BRPM | DIASTOLIC BLOOD PRESSURE: 90 MMHG | TEMPERATURE: 96 F | HEART RATE: 62 BPM

## 2020-06-17 DIAGNOSIS — E78.00 PURE HYPERCHOLESTEROLEMIA, UNSPECIFIED: ICD-10-CM

## 2020-06-17 DIAGNOSIS — G45.4 TRANSIENT GLOBAL AMNESIA: ICD-10-CM

## 2020-06-17 DIAGNOSIS — Z98.890 OTHER SPECIFIED POSTPROCEDURAL STATES: Chronic | ICD-10-CM

## 2020-06-17 DIAGNOSIS — I10 ESSENTIAL (PRIMARY) HYPERTENSION: ICD-10-CM

## 2020-06-17 DIAGNOSIS — R56.9 UNSPECIFIED CONVULSIONS: ICD-10-CM

## 2020-06-17 PROCEDURE — 99222 1ST HOSP IP/OBS MODERATE 55: CPT | Mod: AI

## 2020-06-17 PROCEDURE — 99221 1ST HOSP IP/OBS SF/LOW 40: CPT

## 2020-06-17 RX ORDER — FOLIC ACID 0.8 MG
1 TABLET ORAL DAILY
Refills: 0 | Status: DISCONTINUED | OUTPATIENT
Start: 2020-06-17 | End: 2020-06-19

## 2020-06-17 RX ORDER — HEPARIN SODIUM 5000 [USP'U]/ML
5000 INJECTION INTRAVENOUS; SUBCUTANEOUS EVERY 8 HOURS
Refills: 0 | Status: DISCONTINUED | OUTPATIENT
Start: 2020-06-17 | End: 2020-06-19

## 2020-06-17 RX ORDER — IRBESARTAN 75 MG/1
1 TABLET ORAL
Qty: 0 | Refills: 0 | DISCHARGE

## 2020-06-17 RX ORDER — CHLORHEXIDINE GLUCONATE 213 G/1000ML
1 SOLUTION TOPICAL
Refills: 0 | Status: DISCONTINUED | OUTPATIENT
Start: 2020-06-17 | End: 2020-06-19

## 2020-06-17 RX ORDER — LOSARTAN POTASSIUM 100 MG/1
50 TABLET, FILM COATED ORAL DAILY
Refills: 0 | Status: DISCONTINUED | OUTPATIENT
Start: 2020-06-17 | End: 2020-06-19

## 2020-06-17 RX ORDER — ATORVASTATIN CALCIUM 80 MG/1
10 TABLET, FILM COATED ORAL AT BEDTIME
Refills: 0 | Status: DISCONTINUED | OUTPATIENT
Start: 2020-06-17 | End: 2020-06-19

## 2020-06-17 RX ORDER — IRBESARTAN 75 MG/1
0 TABLET ORAL
Qty: 0 | Refills: 0 | DISCHARGE

## 2020-06-17 RX ORDER — METOPROLOL TARTRATE 50 MG
50 TABLET ORAL
Refills: 0 | Status: DISCONTINUED | OUTPATIENT
Start: 2020-06-17 | End: 2020-06-19

## 2020-06-17 RX ORDER — ASPIRIN/CALCIUM CARB/MAGNESIUM 324 MG
81 TABLET ORAL DAILY
Refills: 0 | Status: DISCONTINUED | OUTPATIENT
Start: 2020-06-17 | End: 2020-06-19

## 2020-06-17 RX ADMIN — Medication 0.1 MILLIGRAM(S): at 21:36

## 2020-06-17 RX ADMIN — ATORVASTATIN CALCIUM 10 MILLIGRAM(S): 80 TABLET, FILM COATED ORAL at 21:20

## 2020-06-17 RX ADMIN — LOSARTAN POTASSIUM 50 MILLIGRAM(S): 100 TABLET, FILM COATED ORAL at 20:33

## 2020-06-17 RX ADMIN — Medication 50 MILLIGRAM(S): at 18:19

## 2020-06-17 NOTE — CONSULT NOTE ADULT - CONSULT REASON
VEEG for better characterization and risk assessment VEEG for better characterization and risk assessment for seizure

## 2020-06-17 NOTE — CONSULT NOTE ADULT - SUBJECTIVE AND OBJECTIVE BOX
Neurology/Epilepsy Consult:    MARTIR POWELL 79y Female  [id number]    Patient is a 79y old right-handed Female who presents for elective VEEG monitoring        HPI: History obtained from patient, EMR reviewed.  2019 patient was brought to [facility] for change in mental status. That morning patient was not answering her phone, then daughter went to her around 9AM and found patient standing in kitchen in front of the window semi-dressed, confused, not able to answer questions. Stroke code was called, NIHSS was 7 on arrival, but TPA was not given due to patient being outside of window period. CTH. CTA/CTP, MRI head failed to reveal acute intracranial pathology. Patient gradually improved, but even after 24hrs was having some difficulty with word finding.  Patient had no memory of that event, jest recalled being told she was already in the hospital for 2 days. It must be noted that patient had severe URI and was not feeling well for 10 days prior to that admission. She was discharged with diagnosis of TGA. She was recently evaluated as outpatient by Dr. Borrego and referred for in-patient VEEG monitoring.   Patient had 1 prior episode about 9 years ago diagnosed as TGA. The night prior to that episode she went out and had "way too many drinks." The next morning patient woke up with nausea, had 1 episode of vomiting, and was getting ready to play tennis. At that point she suddenly realized that she had difficulty remembering certain things. Patient called her daughter, and then was taken to ED for evaluation. Patient had difficulty remembering certain aspects of her morning, and was very aware of that. She followed up with neurologist at [facility] and was diagnosed with TGA.        PAST MEDICAL & SURGICAL HISTORY:  Transient global amnesia   DLD  HTN   Headaches  History of D&C        FAMILY HISTORY:  Family history of seizure in mother        Social History:   Retired  Lives alone  Has 2 children  Quit smoking over 30 years ago  ETOH 1-2 glasses of wine or beer with dinner        Allergy:  No Known Drug Allergies  shellfish (Rash; Flushing; Drowsiness)  SHRIMPS (Pruritus; Rash)        Home Medications:  aspirin 81 mg oral delayed release tablet: 1 tab(s) orally once a day   folic acid 1 mg oral tablet: 1 tab(s) orally once a day (16 Dec 2019 18:15)  irbesartan 150 mg oral tablet in the morning  metoprolol succinate 50 mg oral tablet, extended release: 1 tab(s) orally twice a day   Multiple Vitamins oral tablet: 1 tab(s) orally once a day (16 Dec 2019 18:15)  pravastatin 40 mg oral tablet at bedtime        MEDICATIONS  (STANDING):  aspirin enteric coated 81 milliGRAM(s) Oral daily  atorvastatin 10 milliGRAM(s) Oral at bedtime  chlorhexidine 4% Liquid 1 Application(s) Topical <User Schedule>  folic acid 1 milliGRAM(s) Oral daily  losartan 50 milliGRAM(s) Oral daily  metoprolol tartrate 50 milliGRAM(s) Oral two times a day  multivitamin 1 Tablet(s) Oral daily    MEDICATIONS  (PRN):  LORazepam   Injectable 2 milliGRAM(s) IV Push once PRN generalized tonic-clonic seizure lasting longer than 2 minutes        T(F): 96.3 (20 @ 10:28), Max: 96.3 (20 @ 10:28)  HR: 62 (- @ 10:28) (62 - 62)  BP: 168/90 (20 @ 10:28) (168/90 - 168/90)  RR: 16 (20 @ 10:28) (16 - 16)  SpO2: --        Neurologic Examination:  General:  Appearance is consistent with chronologic age.  No abnormal facies.   General: The patient is oriented to person, place, time and date.  Recent and remote memory intact.  Follows 4-step directions. Fund of knowledge is intact and normal.  Language with normal repetition, comprehension and naming.  Nondysarthric.    Cranial nerves: EOMI w/o nystagmus, skew or reported double vision.  PERRL.  No ptosis/weakness of eyelid closure.  Facial sensation is normal with normal bite.  No facial asymmetry.  Hearing grossly intact b/l.  Palate elevates midline.  Tongue midline.  Motor examination:   Normal tone, bulk and range of motion.  No tenderness, twitching, tremors or involuntary movements.  Formal Muscle Strength Testin/5 UE; 5/5 LE.  No observable drift.  Reflexes:   2+ b/l   Sensory examination:   Intact to light touch and pinprick, pain, temperature.  Cerebellum:   FTN/HKS intact with normal EDWIGE in all limbs.  No dysmetria or dysdiadokinesia.  Gait narrow based and normal.      Labs:   2020 COVID 19 PCR negative        Neuroimaging:  CT Head:   < from: CT Head No Cont (19 @ 18:08) >  IMPRESSION:   No acute intracranial hemorrhage, mass effect, or demarcated territorial   infarct by CT.  Mild white matter microvascular ischemic change.    < end of copied text >        CT Angiography/Perfusion:   < from: CT Perfusion w/ Maps w/ IV Cont (19 @ 13:10) >  IMPRESSION:    1.  No evidence of large vessel occlusion. No definite perfusion   abnormality.    2.  Calcific plaque at the right ICA origin with about 50% stenosis.    3.  Motion artifact somewhat limits evaluation of the intracranial   circulation.    4.  Bilateral palatine tonsillar prominence, correlate clinically for   tonsillitis.    < end of copied text >        MRI Brain:   < from: MR Head No Cont (19 @ 17:32) >  Motion artifact limits the examination.    The third and lateral ventricles are mildly enlarged as are the cortical   sulci consistent with a mild degree of cortical atrophy. The fourth   ventricle is normal in size and position.    On the T2 and FLAIR images there are patchy foci of hyperintense signal   intensity in the periventricular white matter is nonspecific and   differential diagnostic possibilities include chronic ischemic change,   foci of gliosis or demyelination.    No MRI evidence to suggest acute ischemic change.    Mucosal thickening in the right sphenoid, bilateral maxillary, bilateral   ethmoid, and bilateral frontal sinuses.    IMPRESSION:    Study limited by motion artifact demonstrates no MRI evidence to suggest   acute ischemic change.    < end of copied text >      Echo:  < from: Transthoracic Echocardiogram (19 @ 12:34) >  Summary:   1. LV Ejection Fraction by Ramirez's Method with a biplane EF of 67 %.   2. Mildly increased LV wall thickness.   3. Spectral Doppler shows impaired relaxation pattern of left   ventricular myocardial filling (Grade I diastolic dysfunction).   4. Color flow doppler and intravenous injection of agitated saline   demonstrates the presence of an intact intra atrial septum.    < end of copied text >         REEG:   < from: EEG (19 @ 10:30) >  STATE  Awake and Drowsy     SYMMETRY  Symmetrical     ORGANIZATION:  Less than optimally organized      PDR  7-8 Hz superimposed by burst of lower range theta activity seen diffusely   over both hemispheres with shifting asymmetry    GENERALIZED SLOWING  Mild to moderate generalized slowing      FOCAL SLOWING  No focal slowing      AREA OF FOCAL SLOWING  As above      BREACH ARTIFACT  No breach artifact      Hyper Ventilation  Was not performed        Photic Stimulation  Was not performed      EPILEPTIFORM ACTIVITY  No clear epileptiform activity      TYPE  As above  As above      EVENTS  No events reported or recorded      IMPRESSIONS  This is an abnormal routine EEG due to the presence of generalized   slowing as described above      CLINICAL CORRELATION  Consistent with diffuse cerebral electrophysiological dysfunction   secondary to none specific cause clinical correlation is recommended    < end of copied text >      Assessment:  This is a 79y Female with h/o migraine headaches, HTN, DLD, episode diagnosed as TGA 9 years ago, who was hospitalized for altered mental status in 2019. Her imaging did not reveal any acute ischemia and patient gradually improved, Patient had recent outpatient neurology visit and referred fro in-patient VEEG monitoring.      Discussed with Dr. Borrego      Plan:   - VEEG for better characterization and risk assessment  - Seizure precautions  - No AED for now  - Ativan 2mg IV PRN for generalized tonic-clonic seizure lasting longer than 2 minutes, or two consecutive seizures without return to baseline in-between (ordered)  - CBC, CMP, Mg  - Keep Mg above 2    Plan discussed with patient in details, all questions answered.    Discussed with PA.

## 2020-06-17 NOTE — H&P ADULT - ASSESSMENT
A 79 female with pmhx of HTN, HLD, transiet global amnesia, recently admitted at  [facility] 12 2/2019 with altered mental status. At [facility], patient was evaluated for a stroke. Her MRI Brain, CT Head and CTA were unremarkable sent by Dr. Borrego for VEEG to evaluate for seizure like activity

## 2020-06-17 NOTE — H&P ADULT - HISTORY OF PRESENT ILLNESS
A 79 female with pmhx of HTN, HLD, transiet global amnesia, recently admitted at  [facility] 12 2/2019 with altered mental status. At [facility], patient was evaluated for a stroke. Her MRI Brain, CT Head and CTA were unremarkable sent by Dr. Borrego for VEEG to evaluate for seizure like activity. Pt denies headache, blurry vision, fever, chills chest pain, shortness of breath, burning with urination . A 79 female with pmhx of HTN, HLD, transiet global amnesia, recently admitted at  [facility] 12 2/2019 with altered mental status. At [facility], patient was evaluated for a stroke. Her MRI Brain, CT Head and CTA were unremarkable sent by Dr. Borrego for VEEG to evaluate for seizure like activity. Pt denies headache, blurry vision, fever, chills chest pain, shortness of breath, burning with urination .

## 2020-06-17 NOTE — H&P ADULT - NSHPSOCIALHISTORY_GEN_ALL_CORE
Tobacco use: former smoker   EtOH use: drinks  1 -2 glasses of wine or beer a day   Illicit drug use: denies

## 2020-06-17 NOTE — H&P ADULT - NSHPPHYSICALEXAM_GEN_ALL_CORE
VITALS:   ICU Vital Signs Last 24 Hrs  T(C): 35.7 (17 Jun 2020 10:28), Max: 35.7 (17 Jun 2020 10:28)  T(F): 96.3 (17 Jun 2020 10:28), Max: 96.3 (17 Jun 2020 10:28)  HR: 62 (17 Jun 2020 10:28) (62 - 62)  BP: 168/90 (17 Jun 2020 10:28) (168/90 - 168/90)    GENERAL: NAD, sitting in chair comfortably  HEAD:  Atraumatic, Normocephalic  EYES: EOMI, PERRLA, conjunctiva and sclera clear, no nystagmus   ENT: Moist mucous membranes  NECK: Supple, No JVD  CHEST/LUNG: Clear to auscultation bilaterally; No rales, rhonchi, wheezing, or rubs. Unlabored respirations  HEART: Regular rate and rhythm; No murmurs, rubs, or gallops  EXTREMITIES:  no edema or tenderness   NERVOUS SYSTEM:  Alert & Oriented X3, speech clear. No deficits   MSK: FROM all 4 extremities, full and equal strength  SKIN: No rashes or lesions

## 2020-06-17 NOTE — CONSULT NOTE ADULT - ATTENDING COMMENTS
Agree with the history and the plan  The history is not highly suggestive of TGA. it is more suggestive of an acute confusional state R/O causes (ie seizure or hypertensive crisis)  will start the monitoring  Seizure preacaution

## 2020-06-18 LAB
ALBUMIN SERPL ELPH-MCNC: 4.1 G/DL — SIGNIFICANT CHANGE UP (ref 3.5–5.2)
ALP SERPL-CCNC: 72 U/L — SIGNIFICANT CHANGE UP (ref 30–115)
ALT FLD-CCNC: 20 U/L — SIGNIFICANT CHANGE UP (ref 0–41)
ANION GAP SERPL CALC-SCNC: 7 MMOL/L — SIGNIFICANT CHANGE UP (ref 7–14)
AST SERPL-CCNC: 17 U/L — SIGNIFICANT CHANGE UP (ref 0–41)
BILIRUB DIRECT SERPL-MCNC: <0.2 MG/DL — SIGNIFICANT CHANGE UP (ref 0–0.2)
BILIRUB INDIRECT FLD-MCNC: >0.4 MG/DL — SIGNIFICANT CHANGE UP (ref 0.2–1.2)
BILIRUB SERPL-MCNC: 0.6 MG/DL — SIGNIFICANT CHANGE UP (ref 0.2–1.2)
BUN SERPL-MCNC: 14 MG/DL — SIGNIFICANT CHANGE UP (ref 10–20)
CALCIUM SERPL-MCNC: 9 MG/DL — SIGNIFICANT CHANGE UP (ref 8.5–10.1)
CHLORIDE SERPL-SCNC: 105 MMOL/L — SIGNIFICANT CHANGE UP (ref 98–110)
CO2 SERPL-SCNC: 27 MMOL/L — SIGNIFICANT CHANGE UP (ref 17–32)
CREAT SERPL-MCNC: 0.7 MG/DL — SIGNIFICANT CHANGE UP (ref 0.7–1.5)
GLUCOSE SERPL-MCNC: 103 MG/DL — HIGH (ref 70–99)
HCT VFR BLD CALC: 39.6 % — SIGNIFICANT CHANGE UP (ref 37–47)
HGB BLD-MCNC: 13.6 G/DL — SIGNIFICANT CHANGE UP (ref 12–16)
MAGNESIUM SERPL-MCNC: 2 MG/DL — SIGNIFICANT CHANGE UP (ref 1.8–2.4)
MCHC RBC-ENTMCNC: 31.1 PG — HIGH (ref 27–31)
MCHC RBC-ENTMCNC: 34.3 G/DL — SIGNIFICANT CHANGE UP (ref 32–37)
MCV RBC AUTO: 90.6 FL — SIGNIFICANT CHANGE UP (ref 81–99)
NRBC # BLD: 0 /100 WBCS — SIGNIFICANT CHANGE UP (ref 0–0)
PLATELET # BLD AUTO: 247 K/UL — SIGNIFICANT CHANGE UP (ref 130–400)
POTASSIUM SERPL-MCNC: 4.7 MMOL/L — SIGNIFICANT CHANGE UP (ref 3.5–5)
POTASSIUM SERPL-SCNC: 4.7 MMOL/L — SIGNIFICANT CHANGE UP (ref 3.5–5)
PROT SERPL-MCNC: 6.2 G/DL — SIGNIFICANT CHANGE UP (ref 6–8)
RBC # BLD: 4.37 M/UL — SIGNIFICANT CHANGE UP (ref 4.2–5.4)
RBC # FLD: 12.3 % — SIGNIFICANT CHANGE UP (ref 11.5–14.5)
SODIUM SERPL-SCNC: 139 MMOL/L — SIGNIFICANT CHANGE UP (ref 135–146)
WBC # BLD: 6.19 K/UL — SIGNIFICANT CHANGE UP (ref 4.8–10.8)
WBC # FLD AUTO: 6.19 K/UL — SIGNIFICANT CHANGE UP (ref 4.8–10.8)

## 2020-06-18 PROCEDURE — 95720 EEG PHY/QHP EA INCR W/VEEG: CPT

## 2020-06-18 PROCEDURE — 99231 SBSQ HOSP IP/OBS SF/LOW 25: CPT

## 2020-06-18 PROCEDURE — 99232 SBSQ HOSP IP/OBS MODERATE 35: CPT

## 2020-06-18 RX ADMIN — Medication 81 MILLIGRAM(S): at 11:06

## 2020-06-18 RX ADMIN — Medication 50 MILLIGRAM(S): at 17:24

## 2020-06-18 RX ADMIN — Medication 0.1 MILLIGRAM(S): at 21:51

## 2020-06-18 RX ADMIN — ATORVASTATIN CALCIUM 10 MILLIGRAM(S): 80 TABLET, FILM COATED ORAL at 21:36

## 2020-06-18 RX ADMIN — LOSARTAN POTASSIUM 50 MILLIGRAM(S): 100 TABLET, FILM COATED ORAL at 05:43

## 2020-06-18 RX ADMIN — Medication 50 MILLIGRAM(S): at 05:43

## 2020-06-18 RX ADMIN — Medication 1 MILLIGRAM(S): at 11:06

## 2020-06-18 RX ADMIN — Medication 1 TABLET(S): at 11:07

## 2020-06-18 NOTE — CHART NOTE - NSCHARTNOTEFT_GEN_A_CORE
Patient seen at bedside, resting comfortably.    All questions and concerns addressed during visit.     Patient encouraged to contact PA with any further issues.     Neurology following, will c/w monitoring for now.  No acute complaints.

## 2020-06-18 NOTE — PROGRESS NOTE ADULT - ASSESSMENT
79y Female with h/o migraine headaches, HTN, DLD, episode diagnosed as TGA 9 years ago, who was hospitalized for altered mental status in December 2019. Her imaging did not reveal any acute ischemia and patient gradually improved, Patient had recent outpatient neurology visit and referred for in-patient VEEG evaluation for seizure like activity.      Assessment & Plan:    1. Probable Seziure:  VEEG for better characterization and risk assessment  Seizure precautions  No AED for now  Ativan 2mg IV PRN for generalized tonic-clonic seizure lasting longer than 2 minutes.  Keep Mg above 2.      2. HTN/HLD:  Continue home meds.        Prophylaxis: Heparin  Code status: Full code    Progress Note Handoff:  Pending consults: None  Pending Tests: VEEG  Family/Patient discussion: Plan of care discussed with Patient.  Disposition: Home after VEEG      Attending: Dr. Lisa Ramirez. Spectra [phone].

## 2020-06-18 NOTE — EEG REPORT - NS EEG TEXT BOX
Epilepsy Attending Note:     MARTIR POWELL    79y Female  MRN [id number]    Vital Signs Last 24 Hrs  T(C): 35.9 (18 Jun 2020 06:09), Max: 36.3 (17 Jun 2020 21:15)  T(F): 96.7 (18 Jun 2020 06:09), Max: 97.3 (17 Jun 2020 21:15)  HR: 67 (18 Jun 2020 06:09) (63 - 70)  BP: 158/73 (18 Jun 2020 06:09) (158/73 - 199/96)  BP(mean): --  RR: 16 (18 Jun 2020 06:09) (16 - 16)  SpO2: 99% (18 Jun 2020 05:41) (99% - 99%)                          13.6   6.19  )-----------( 247      ( 18 Jun 2020 06:14 )             39.6       06-18    139  |  105  |  14  ----------------------------<  103<H>  4.7   |  27  |  0.7    Ca    9.0      18 Jun 2020 06:14  Mg     2.0     06-18    TPro  6.2  /  Alb  4.1  /  TBili  0.6  /  DBili  <0.2  /  AST  17  /  ALT  20  /  AlkPhos  72  06-18      MEDICATIONS  (STANDING):  aspirin enteric coated 81 milliGRAM(s) Oral daily  atorvastatin 10 milliGRAM(s) Oral at bedtime  chlorhexidine 4% Liquid 1 Application(s) Topical <User Schedule>  folic acid 1 milliGRAM(s) Oral daily  heparin   Injectable 5000 Unit(s) SubCutaneous every 8 hours  losartan 50 milliGRAM(s) Oral daily  metoprolol tartrate 50 milliGRAM(s) Oral two times a day  multivitamin 1 Tablet(s) Oral daily    MEDICATIONS  (PRN):  LORazepam   Injectable 2 milliGRAM(s) IV Push once PRN generalized tonic-clonic seizure lasting longer than 2 minutes            VEEG in the last 24 hours:    Background------------------9-10 hz    Focal and generalized slowing-----------borderline right FT focal slowing    Interictal activity----------------------- none    Events---------------------------------  none    Seizures------------------------------- none    Impression:  borderline abnormal as above    Plan -  will continue the monitoring             Ps             sleep deprivation             seizure precaution

## 2020-06-18 NOTE — PROGRESS NOTE ADULT - SUBJECTIVE AND OBJECTIVE BOX
SHERRYLARRYMARTIR  79y  Female    Patient is a 79y old  Female who presents for VEEG (17 Jun 2020 11:44)      INTERVAL HPI/OVERNIGHT EVENTS:  No interval events.  No seizures overnight.  BP elevated overnight but resolved with medications.      REVIEW OF SYSTEMS:  At least 10 systems were reviewed in ROS.   All systems reviewed are within normal limits.      VITALS:  T(F): 96.7 (06-18-20 @ 06:09), Max: 97.3 (06-17-20 @ 21:15)  HR: 67 (06-18-20 @ 06:09) (63 - 70)  BP: 158/73 (06-18-20 @ 06:09) (158/73 - 199/96)  RR: 16 (06-18-20 @ 06:09) (16 - 16)  SpO2: 99% (06-18-20 @ 05:41) (99% - 99%)      PHYSICAL EXAM:  GENERAL: NAD, well-developed  HEAD:  Atraumatic, Normocephalic, VEEG ongoing.  EYES: conjunctiva and sclera clear  ENMT: Moist mucous membranes  NECK: Supple, Normal thyroid  NERVOUS SYSTEM:  Alert & Oriented X 3, Good concentration; Motor Strength 5/5 B/L upper and lower extremities  CHEST/LUNG: Clear to auscultation bilaterally; No rales, rhonchi, wheezing, or rubs  HEART: Regular rate and rhythm; No murmurs, rubs, or gallops  ABDOMEN: Soft, Nontender, Nondistended; Bowel sounds present  EXTREMITIES:  2+ Peripheral Pulses, No clubbing, cyanosis, or edema.  LYMPH: No lymphadenopathy noted  SKIN: No rashes or lesions    Consultant(s) Notes Reviewed:  [x ] YES  [ ] NO  Care Discussed with Consultants/Other Providers [ x] YES  [ ] NO    LABS:                        13.6   6.19  )-----------( 247      ( 18 Jun 2020 06:14 )             39.6       06-18    139  |  105  |  14  ----------------------------<  103<H>  4.7   |  27  |  0.7    Ca    9.0      18 Jun 2020 06:14  Mg     2.0     06-18    TPro  6.2  /  Alb  4.1  /  TBili  0.6  /  DBili  <0.2  /  AST  17  /  ALT  20  /  AlkPhos  72  06-18        MICROBIOLOGY: None      RADIOLOGY & ADDITIONAL TESTS:  VEEG in the last 24 hours:    Background------------------9-10 hz    Focal and generalized slowing-----------borderline right FT focal slowing    Interictal activity----------------------- none    Events---------------------------------  none    Seizures------------------------------- none    Impression:  borderline abnormal as above      Imaging Personally Reviewed:  [x] YES  [ ] NO    MEDICATIONS  (STANDING):  aspirin enteric coated 81 milliGRAM(s) Oral daily  atorvastatin 10 milliGRAM(s) Oral at bedtime  chlorhexidine 4% Liquid 1 Application(s) Topical <User Schedule>  folic acid 1 milliGRAM(s) Oral daily  heparin   Injectable 5000 Unit(s) SubCutaneous every 8 hours  losartan 50 milliGRAM(s) Oral daily  metoprolol tartrate 50 milliGRAM(s) Oral two times a day  multivitamin 1 Tablet(s) Oral daily      MEDICATIONS  (PRN):  LORazepam   Injectable 2 milliGRAM(s) IV Push once PRN generalized tonic-clonic seizure lasting longer than 2 minutes        Home Medications:  aspirin 81 mg oral delayed release tablet: 2 tab(s) orally once a day (17 Jun 2020 11:48)  folic acid 1 mg oral tablet: 1 tab(s) orally once a day (17 Jun 2020 11:48)  irbesartan 150 mg oral tablet: 1 tab(s) orally once a day (17 Jun 2020 11:48)  metoprolol succinate 50 mg oral tablet, extended release: 1 tab(s) orally once a day (17 Jun 2020 11:48)  Multiple Vitamins oral tablet: 1 tab(s) orally once a day (17 Jun 2020 11:48)  pravastatin 40 mg oral tablet: 1 tab(s) orally once a day (17 Jun 2020 11:48)        HEALTH ISSUES - PROBLEM Dx:  Seizure-like activity  Transient global amnesia  HTN (hypertension)  High cholesterol

## 2020-06-19 ENCOUNTER — TRANSCRIPTION ENCOUNTER (OUTPATIENT)
Age: 80
End: 2020-06-19

## 2020-06-19 VITALS
RESPIRATION RATE: 16 BRPM | SYSTOLIC BLOOD PRESSURE: 145 MMHG | HEART RATE: 76 BPM | DIASTOLIC BLOOD PRESSURE: 69 MMHG | TEMPERATURE: 96 F

## 2020-06-19 PROCEDURE — 99231 SBSQ HOSP IP/OBS SF/LOW 25: CPT

## 2020-06-19 PROCEDURE — 99238 HOSP IP/OBS DSCHRG MGMT 30/<: CPT

## 2020-06-19 PROCEDURE — 95720 EEG PHY/QHP EA INCR W/VEEG: CPT

## 2020-06-19 RX ORDER — HYDRALAZINE HCL 50 MG
5 TABLET ORAL ONCE
Refills: 0 | Status: COMPLETED | OUTPATIENT
Start: 2020-06-19 | End: 2020-06-19

## 2020-06-19 RX ORDER — METOPROLOL TARTRATE 50 MG
1 TABLET ORAL
Qty: 0 | Refills: 0 | DISCHARGE
Start: 2020-06-19

## 2020-06-19 RX ORDER — THIAMINE MONONITRATE (VIT B1) 100 MG
1 TABLET ORAL
Qty: 0 | Refills: 0 | DISCHARGE

## 2020-06-19 RX ADMIN — LOSARTAN POTASSIUM 50 MILLIGRAM(S): 100 TABLET, FILM COATED ORAL at 06:23

## 2020-06-19 RX ADMIN — CHLORHEXIDINE GLUCONATE 1 APPLICATION(S): 213 SOLUTION TOPICAL at 06:23

## 2020-06-19 RX ADMIN — Medication 50 MILLIGRAM(S): at 06:23

## 2020-06-19 RX ADMIN — Medication 5 MILLIGRAM(S): at 01:35

## 2020-06-19 NOTE — PROGRESS NOTE ADULT - ASSESSMENT
79y Female with h/o migraine headaches, HTN, DLD, episode diagnosed as TGA 9 years ago, who was hospitalized for Encephalopathy with uncertain etiology in December 2019. Her imaging did not reveal any acute ischemia and patient gradually improved, Patient had recent outpatient neurology visit and referred for in-patient VEEG evaluation for seizure like activity.      Assessment & Plan:    1. Encephalopathy: Seizure ruled out.   Completed VEEG for better characterization and risk assessment showed no abnormalities.  No AEDs given.   Out patient follow up.      2. HTN/HLD:  Continue home meds.  Out patient follow up with Cardiologist for medication adjustments for better BP control.      Prophylaxis: Heparin  Code status: Full code    Progress Note Handoff:  Pending consults: None  Pending Tests: None  Family/Patient discussion: Plan of care discussed with Patient & Staff.  Disposition: Home today.       Attending: Dr. Lisa Ramirez. Spectra [phone]. 79y Female with h/o migraine headaches, HTN, DLD, episode diagnosed as TGA 9 years ago, who was hospitalized for Encephalopathy with uncertain etiology in December 2019. Her imaging did not reveal any acute ischemia and patient gradually improved, Patient had recent outpatient neurology visit and referred for in-patient VEEG evaluation for seizure like activity.      Assessment & Plan:    1. Recurrent Encephalopathy: Seizure ruled out.   Mental status now at baseline.   Completed VEEG for better characterization and risk assessment showed no abnormalities.  No AEDs given.   Out patient follow up.      2. HTN/HLD:  Continue home meds.  Out patient follow up with Cardiologist for medication adjustments for better BP control.      Prophylaxis: Heparin  Code status: Full code    Progress Note Handoff:  Pending consults: None  Pending Tests: None  Family/Patient discussion: Plan of care discussed with Patient & Staff.  Disposition: Home today.       Attending: Dr. Lisa Ramirez. Spectra [phone].

## 2020-06-19 NOTE — DISCHARGE NOTE PROVIDER - NSDCCPCAREPLAN_GEN_ALL_CORE_FT
PRINCIPAL DISCHARGE DIAGNOSIS  Diagnosis: Seizure  Assessment and Plan of Treatment: you were admitted for monitoring, no adjustments to your medications were made.  Make sure to follow up with your Neurologist and PCP on discharge.  You had high blood pressure as well, make sure to follow up with your cardiologist for medication adjustment PRINCIPAL DISCHARGE DIAGNOSIS  Diagnosis: Change in mental status  Assessment and Plan of Treatment: You were admitted for monitoring, no adjustments to your medications were made.  Make sure to follow up with your Neurologist and PCP/Cardiologist on discharge. You had high blood pressure as well, make sure to follow up with your cardiologist for medication adjustment for better BP control.

## 2020-06-19 NOTE — DISCHARGE NOTE PROVIDER - PROVIDER TOKENS
PROVIDER:[TOKEN:[[id number]]],PROVIDER:[TOKEN:[[id number]]],PROVIDER:[TOKEN:[[id number]]]

## 2020-06-19 NOTE — DISCHARGE NOTE PROVIDER - NSDCFUSCHEDAPPT_GEN_ALL_CORE_FT
MARTIR POWELL ; 06/22/2020 ; [facility] [address] MARTIR POWELL ; 06/22/2020 ; [facility] [address] MARTIR POWELL ; 06/22/2020 ; [facility] [address]

## 2020-06-19 NOTE — DISCHARGE NOTE NURSING/CASE MANAGEMENT/SOCIAL WORK - PATIENT PORTAL LINK FT
You can access the FollowMyHealth Patient Portal offered by [facility] by registering at the following website: [email or web address]. By joining [employer]’s FollowMyHealth portal, you will also be able to view your health information using other applications (apps) compatible with our system.

## 2020-06-19 NOTE — DISCHARGE NOTE PROVIDER - HOSPITAL COURSE
to be completed by attending 79y Female with h/o migraine headaches, HTN, DLD, episode diagnosed as TGA 9 years ago, who was hospitalized for Encephalopathy with uncertain etiology in December 2019. Her imaging did not reveal any acute ischemia and patient gradually improved, Patient had recent outpatient neurology visit and referred for in-patient VEEG evaluation for seizure like activity.            Assessment & Plan:        1. Recurrent Encephalopathy: Seizure ruled out.     Mental status now at baseline.    Completed VEEG for better characterization and risk assessment showed no abnormalities.    No AEDs given.     Out patient follow up.            2. HTN/HLD:    Continued home meds.    Out patient follow up with Cardiologist for medication adjustments for better BP control.

## 2020-06-19 NOTE — PROGRESS NOTE ADULT - SUBJECTIVE AND OBJECTIVE BOX
SHERRYMARTIR  79y  Female    Patient is a 79y old  Female who presents for VEEG (17 Jun 2020 11:44)      INTERVAL HPI/OVERNIGHT EVENTS:  No interval events.  No seizures overnight.  Completed VEEG and stable for discharge home.  BP fluctuations still present.       REVIEW OF SYSTEMS:  At least 10 systems were reviewed in ROS.   All systems reviewed are within normal limits.      VITALS:  T(C): 35.8 (19 Jun 2020 06:12), Max: 36.1 (18 Jun 2020 14:06)  T(F): 96.5 (19 Jun 2020 06:12), Max: 97 (18 Jun 2020 14:06)  HR: 76 (19 Jun 2020 06:12) (64 - 76)  BP: 145/69 (19 Jun 2020 06:12) (145/69 - 181/91)  BP(mean): --  RR: 16 (19 Jun 2020 06:12) (16 - 16)  SpO2: --      PHYSICAL EXAM:  GENERAL: NAD, well-developed  HEAD:  Atraumatic, Normocephalic, VEEG ongoing.  EYES: conjunctiva and sclera clear  ENMT: Moist mucous membranes  NECK: Supple, Normal thyroid  NERVOUS SYSTEM:  Alert & Oriented X 3, Good concentration; Motor Strength 5/5 B/L upper and lower extremities  CHEST/LUNG: Clear to auscultation bilaterally; No rales, rhonchi, wheezing, or rubs  HEART: Regular rate and rhythm; No murmurs, rubs, or gallops  ABDOMEN: Soft, Nontender, Nondistended; Bowel sounds present  EXTREMITIES:  2+ Peripheral Pulses, No clubbing, cyanosis, or edema.  LYMPH: No lymphadenopathy noted  SKIN: No rashes or lesions    Consultant(s) Notes Reviewed:  [x ] YES  [ ] NO  Care Discussed with Consultants/Other Providers [ x] YES  [ ] NO    LABS:                        13.6   6.19  )-----------( 247      ( 18 Jun 2020 06:14 )             39.6       06-18    139  |  105  |  14  ----------------------------<  103<H>  4.7   |  27  |  0.7    Ca    9.0      18 Jun 2020 06:14  Mg     2.0     06-18    TPro  6.2  /  Alb  4.1  /  TBili  0.6  /  DBili  <0.2  /  AST  17  /  ALT  20  /  AlkPhos  72  06-18        MICROBIOLOGY: None      RADIOLOGY & ADDITIONAL TESTS:  VEEG in the last 24 hours:    Background------------------8-9    Focal and generalized slowing------------none ( the borderline right focal slowing reported yesterday was not  confirmed)    Interictal activity-----none    Events----------------- none    Seizures-------------- none    Impression:    normal  V-EEG X 48 hours      Imaging Personally Reviewed:  [x] YES  [ ] NO    MEDICATIONS  (STANDING):  aspirin enteric coated 81 milliGRAM(s) Oral daily  atorvastatin 10 milliGRAM(s) Oral at bedtime  chlorhexidine 4% Liquid 1 Application(s) Topical <User Schedule>  folic acid 1 milliGRAM(s) Oral daily  heparin   Injectable 5000 Unit(s) SubCutaneous every 8 hours  losartan 50 milliGRAM(s) Oral daily  metoprolol tartrate 50 milliGRAM(s) Oral two times a day  multivitamin 1 Tablet(s) Oral daily      MEDICATIONS  (PRN):  LORazepam   Injectable 2 milliGRAM(s) IV Push once PRN generalized tonic-clonic seizure lasting longer than 2 minutes      Home Medications:  aspirin 81 mg oral delayed release tablet: 2 tab(s) orally once a day (17 Jun 2020 11:48)  folic acid 1 mg oral tablet: 1 tab(s) orally once a day (17 Jun 2020 11:48)  irbesartan 150 mg oral tablet: 1 tab(s) orally once a day (17 Jun 2020 11:48)  metoprolol succinate 50 mg oral tablet, extended release: 1 tab(s) orally once a day (17 Jun 2020 11:48)  Multiple Vitamins oral tablet: 1 tab(s) orally once a day (17 Jun 2020 11:48)  pravastatin 40 mg oral tablet: 1 tab(s) orally once a day (17 Jun 2020 11:48)        HEALTH ISSUES - PROBLEM Dx:  Seizure-like activity  Transient global amnesia  HTN (hypertension)  High cholesterol

## 2020-06-19 NOTE — DISCHARGE NOTE PROVIDER - NSDCMRMEDTOKEN_GEN_ALL_CORE_FT
aspirin 81 mg oral delayed release tablet: 2 tab(s) orally once a day  folic acid 1 mg oral tablet: 1 tab(s) orally once a day  irbesartan 150 mg oral tablet: 1 tab(s) orally once a day  metoprolol succinate 50 mg oral tablet, extended release: 1 tab(s) orally once a day  Multiple Vitamins oral tablet: 1 tab(s) orally once a day  pravastatin 40 mg oral tablet: 1 tab(s) orally once a day  thiamine 100 mg oral tablet: 1 tab(s) orally once a day aspirin 81 mg oral delayed release tablet: 2 tab(s) orally once a day  folic acid 1 mg oral tablet: 1 tab(s) orally once a day  irbesartan 150 mg oral tablet: 1 tab(s) orally once a day  metoprolol tartrate 50 mg oral tablet: 1 tab(s) orally 2 times a day  Multiple Vitamins oral tablet: 1 tab(s) orally once a day  pravastatin 40 mg oral tablet: 1 tab(s) orally once a day  thiamine 100 mg oral tablet: 1 tab(s) orally once a day

## 2020-06-19 NOTE — EEG REPORT - NS EEG TEXT BOX
Epilepsy Attending Note:     MARTIR POWELL    79y Female  MRN [id number]    Vital Signs Last 24 Hrs  T(C): 35.8 (19 Jun 2020 06:12), Max: 36.1 (18 Jun 2020 14:06)  T(F): 96.5 (19 Jun 2020 06:12), Max: 97 (18 Jun 2020 14:06)  HR: 76 (19 Jun 2020 06:12) (64 - 76)  BP: 145/69 (19 Jun 2020 06:12) (145/69 - 181/91)  BP(mean): --  RR: 16 (19 Jun 2020 06:12) (16 - 16)  SpO2: --                          13.6   6.19  )-----------( 247      ( 18 Jun 2020 06:14 )             39.6       06-18    139  |  105  |  14  ----------------------------<  103<H>  4.7   |  27  |  0.7    Ca    9.0      18 Jun 2020 06:14  Mg     2.0     06-18    TPro  6.2  /  Alb  4.1  /  TBili  0.6  /  DBili  <0.2  /  AST  17  /  ALT  20  /  AlkPhos  72  06-18      MEDICATIONS  (STANDING):  aspirin enteric coated 81 milliGRAM(s) Oral daily  atorvastatin 10 milliGRAM(s) Oral at bedtime  chlorhexidine 4% Liquid 1 Application(s) Topical <User Schedule>  folic acid 1 milliGRAM(s) Oral daily  heparin   Injectable 5000 Unit(s) SubCutaneous every 8 hours  losartan 50 milliGRAM(s) Oral daily  metoprolol tartrate 50 milliGRAM(s) Oral two times a day  multivitamin 1 Tablet(s) Oral daily    MEDICATIONS  (PRN):  LORazepam   Injectable 2 milliGRAM(s) IV Push once PRN generalized tonic-clonic seizure lasting longer than 2 minutes            VEEG in the last 24 hours:    Background------------------8-9    Focal and generalized slowing------------none ( the borderline right focal slowing reported yesterday was not  confirmed)    Interictal activity-----none    Events----------------- none    Seizures-------------- none    Impression:    normal  V-EEG X 48 hours    Plan - DC the monitoring           F/U with cardiology for better management of blood pressure

## 2020-06-22 ENCOUNTER — APPOINTMENT (OUTPATIENT)
Dept: CARDIOLOGY | Facility: CLINIC | Age: 80
End: 2020-06-22
Payer: MEDICARE

## 2020-06-22 DIAGNOSIS — Z84.89 FAMILY HISTORY OF OTHER SPECIFIED CONDITIONS: ICD-10-CM

## 2020-06-22 DIAGNOSIS — G43.909 MIGRAINE, UNSPECIFIED, NOT INTRACTABLE, WITHOUT STATUS MIGRAINOSUS: ICD-10-CM

## 2020-06-22 DIAGNOSIS — Z87.891 PERSONAL HISTORY OF NICOTINE DEPENDENCE: ICD-10-CM

## 2020-06-22 DIAGNOSIS — G93.40 ENCEPHALOPATHY, UNSPECIFIED: ICD-10-CM

## 2020-06-22 DIAGNOSIS — Z79.82 LONG TERM (CURRENT) USE OF ASPIRIN: ICD-10-CM

## 2020-06-22 DIAGNOSIS — E78.5 HYPERLIPIDEMIA, UNSPECIFIED: ICD-10-CM

## 2020-06-22 DIAGNOSIS — I10 ESSENTIAL (PRIMARY) HYPERTENSION: ICD-10-CM

## 2020-06-22 DIAGNOSIS — Z91.013 ALLERGY TO SEAFOOD: ICD-10-CM

## 2020-06-22 DIAGNOSIS — Z60.2 PROBLEMS RELATED TO LIVING ALONE: ICD-10-CM

## 2020-06-22 PROCEDURE — 93000 ELECTROCARDIOGRAM COMPLETE: CPT

## 2020-06-22 PROCEDURE — 99214 OFFICE O/P EST MOD 30 MIN: CPT

## 2020-06-22 SDOH — SOCIAL STABILITY - SOCIAL INSECURITY: PROBLEMS RELATED TO LIVING ALONE: Z60.2

## 2020-09-08 NOTE — H&P ADULT - NSICDXPASTMEDICALHX_GEN_ALL_CORE_FT
See lactation note   PAST MEDICAL HISTORY:  High cholesterol     HTN (hypertension)     Transient global amnesia

## 2020-09-11 ENCOUNTER — RECORD ABSTRACTING (OUTPATIENT)
Age: 80
End: 2020-09-11

## 2020-09-11 DIAGNOSIS — Z78.9 OTHER SPECIFIED HEALTH STATUS: ICD-10-CM

## 2020-09-11 RX ORDER — IRBESARTAN 150 MG/1
150 TABLET ORAL DAILY
Qty: 30 | Refills: 0 | Status: ACTIVE | COMMUNITY

## 2020-09-11 RX ORDER — ASPIRIN 81 MG
81 TABLET, DELAYED RELEASE (ENTERIC COATED) ORAL DAILY
Refills: 0 | Status: ACTIVE | COMMUNITY

## 2020-09-26 ENCOUNTER — LABORATORY RESULT (OUTPATIENT)
Age: 80
End: 2020-09-26

## 2020-10-01 ENCOUNTER — APPOINTMENT (OUTPATIENT)
Dept: CARDIOLOGY | Facility: CLINIC | Age: 80
End: 2020-10-01
Payer: MEDICARE

## 2020-10-01 VITALS
WEIGHT: 145.5 LBS | SYSTOLIC BLOOD PRESSURE: 150 MMHG | HEIGHT: 62 IN | TEMPERATURE: 98.3 F | BODY MASS INDEX: 26.78 KG/M2 | DIASTOLIC BLOOD PRESSURE: 80 MMHG | HEART RATE: 57 BPM

## 2020-10-01 PROCEDURE — 99214 OFFICE O/P EST MOD 30 MIN: CPT

## 2020-10-01 PROCEDURE — 93000 ELECTROCARDIOGRAM COMPLETE: CPT

## 2020-10-01 NOTE — PHYSICAL EXAM
[General Appearance - Well Developed] : well developed [Normal Appearance] : normal appearance [General Appearance - Well Nourished] : well nourished [General Appearance - In No Acute Distress] : no acute distress [Normal Conjunctiva] : the conjunctiva exhibited no abnormalities [Normal Oropharynx] : normal oropharynx [Normal Jugular Venous V Waves Present] : normal jugular venous V waves present [] : no respiratory distress [Exaggerated Use Of Accessory Muscles For Inspiration] : no accessory muscle use [Auscultation Breath Sounds / Voice Sounds] : lungs were clear to auscultation bilaterally [Heart Rate And Rhythm] : heart rate and rhythm were normal [Heart Sounds] : normal S1 and S2 [Arterial Pulses Normal] : the arterial pulses were normal [Edema] : no peripheral edema present [FreeTextEntry1] : Grade I/VI systolic murmur at LSB [Abdomen Soft] : soft [Abdomen Tenderness] : non-tender [Abnormal Walk] : normal gait [Nail Clubbing] : no clubbing of the fingernails [Cyanosis, Localized] : no localized cyanosis [Skin Color & Pigmentation] : normal skin color and pigmentation [Skin Turgor] : normal skin turgor [Oriented To Time, Place, And Person] : oriented to person, place, and time [Affect] : the affect was normal

## 2020-10-01 NOTE — DISCUSSION/SUMMARY
[FreeTextEntry1] : Patient was instructed to target their T. Cholesterol to less than 200 mg/dl and LDL cholesterol to less than 100 mg/dl.\par Exercise and weight loss was advised.\par Maintain cardiac medications. \par She had just taken both of her BP medications less than 1/2 hr prior to the office visit. Otherwise, she reports that her BP readings have been in normal ranges.\par Patient was advised to repeat a BMP, CBC , fasting lipid profile and hepatic panel.\par RV in 2/21.\par

## 2021-01-29 ENCOUNTER — LABORATORY RESULT (OUTPATIENT)
Age: 81
End: 2021-01-29

## 2021-02-04 ENCOUNTER — APPOINTMENT (OUTPATIENT)
Dept: CARDIOLOGY | Facility: CLINIC | Age: 81
End: 2021-02-04
Payer: MEDICARE

## 2021-02-04 VITALS
DIASTOLIC BLOOD PRESSURE: 80 MMHG | SYSTOLIC BLOOD PRESSURE: 160 MMHG | BODY MASS INDEX: 26.68 KG/M2 | WEIGHT: 145 LBS | HEIGHT: 62 IN | HEART RATE: 59 BPM | TEMPERATURE: 94.9 F

## 2021-02-04 PROCEDURE — 93000 ELECTROCARDIOGRAM COMPLETE: CPT

## 2021-02-04 PROCEDURE — 99214 OFFICE O/P EST MOD 30 MIN: CPT

## 2021-02-04 NOTE — REVIEW OF SYSTEMS
Date: 3/7/2023    Patient Name: Barby Crisostomo          To Whom it may concern: This letter has been written at the patient's request. The above patient was seen at the [facility] for treatment of a medical condition. The patient may return to work starting on 03/08/2023 with the following limitations please allow patient to work from home through 03/10/2023  .         Sincerely,          GONSALO Kan [Negative] : Heme/Lymph

## 2021-02-04 NOTE — PHYSICAL EXAM
[General Appearance - Well Developed] : well developed [Normal Appearance] : normal appearance [General Appearance - Well Nourished] : well nourished [General Appearance - In No Acute Distress] : no acute distress [Normal Conjunctiva] : the conjunctiva exhibited no abnormalities [Normal Oropharynx] : normal oropharynx [Normal Jugular Venous V Waves Present] : normal jugular venous V waves present [] : no respiratory distress [Exaggerated Use Of Accessory Muscles For Inspiration] : no accessory muscle use [Auscultation Breath Sounds / Voice Sounds] : lungs were clear to auscultation bilaterally [Heart Rate And Rhythm] : heart rate and rhythm were normal [Heart Sounds] : normal S1 and S2 [Arterial Pulses Normal] : the arterial pulses were normal [Edema] : no peripheral edema present [Abdomen Soft] : soft [Abdomen Tenderness] : non-tender [Abnormal Walk] : normal gait [Nail Clubbing] : no clubbing of the fingernails [Cyanosis, Localized] : no localized cyanosis [Skin Color & Pigmentation] : normal skin color and pigmentation [Skin Turgor] : normal skin turgor [Oriented To Time, Place, And Person] : oriented to person, place, and time [Affect] : the affect was normal [FreeTextEntry1] : Grade I/VI systolic murmur at LSB

## 2021-02-04 NOTE — DISCUSSION/SUMMARY
[FreeTextEntry1] : Patient was instructed to target her T. Cholesterol to less than 200 mg/dl and LDL cholesterol to less than 100 mg/dl. Start a low fat, low cholesterol diet.\par Exercise and weight loss was advised.\par Maintain cardiac medications. \par She had just taken both of her BP medications less than 1/2 hr prior to the office visit. Otherwise, she reports that her BP readings have been in normal ranges.\par Patient was advised to repeat a BMP, CBC , fasting lipid profile and hepatic panel.\par RV in 6 months.\par

## 2021-08-04 ENCOUNTER — APPOINTMENT (OUTPATIENT)
Dept: CARDIOLOGY | Facility: CLINIC | Age: 81
End: 2021-08-04
Payer: MEDICARE

## 2021-08-04 VITALS
WEIGHT: 140 LBS | BODY MASS INDEX: 25.76 KG/M2 | DIASTOLIC BLOOD PRESSURE: 90 MMHG | TEMPERATURE: 98 F | HEART RATE: 58 BPM | SYSTOLIC BLOOD PRESSURE: 150 MMHG | HEIGHT: 62 IN

## 2021-08-04 PROCEDURE — 99214 OFFICE O/P EST MOD 30 MIN: CPT

## 2021-08-04 PROCEDURE — 93000 ELECTROCARDIOGRAM COMPLETE: CPT

## 2021-08-04 NOTE — DISCUSSION/SUMMARY
[FreeTextEntry1] : Patient was instructed to target her T. Cholesterol to less than 200 mg/dl and LDL cholesterol to less than 100 mg/dl. Start a low fat, low cholesterol diet.\par Exercise and weight loss was advised.\par Maintain cardiac medications.  She will continue to monitor her BP at home which have been in the normal range.\par Patient was advised to repeat a BMP, CBC , fasting lipid profile and hepatic panel.\par RV in 6 months.\par

## 2021-11-29 NOTE — DISCHARGE NOTE PROVIDER - CARE PROVIDER_API CALL
Kori Gray  INTERNAL MEDICINE  [address] [place] [address]  Phone: [phone]  Fax: [phone]  Follow Up Time:     Tanmay Borrego  NEUROLOGY  [address] [facility] [address]  [place] [address]  Phone: [phone]  Fax: [phone]  Follow Up Time:     Miles Hidalgo  Interventional Cardiology  [address] [place] [address]  Phone: [phone]  Fax: [phone]  Follow Up Time: normal... Well appearing, awake, alert, oriented to person, place, time/situation and in no apparent distress.

## 2022-01-20 ENCOUNTER — RX RENEWAL (OUTPATIENT)
Age: 82
End: 2022-01-20

## 2022-01-28 ENCOUNTER — LABORATORY RESULT (OUTPATIENT)
Age: 82
End: 2022-01-28

## 2022-02-03 ENCOUNTER — APPOINTMENT (OUTPATIENT)
Dept: CARDIOLOGY | Facility: CLINIC | Age: 82
End: 2022-02-03
Payer: MEDICARE

## 2022-02-03 VITALS
SYSTOLIC BLOOD PRESSURE: 140 MMHG | HEART RATE: 60 BPM | DIASTOLIC BLOOD PRESSURE: 84 MMHG | BODY MASS INDEX: 26.13 KG/M2 | TEMPERATURE: 98.3 F | WEIGHT: 142 LBS | HEIGHT: 62 IN

## 2022-02-03 PROCEDURE — 93000 ELECTROCARDIOGRAM COMPLETE: CPT

## 2022-02-03 PROCEDURE — 99214 OFFICE O/P EST MOD 30 MIN: CPT

## 2022-02-03 RX ORDER — METOPROLOL TARTRATE 50 MG/1
50 TABLET, FILM COATED ORAL TWICE DAILY
Qty: 180 | Refills: 0 | Status: DISCONTINUED | COMMUNITY
End: 2022-02-03

## 2022-02-03 RX ORDER — PRAVASTATIN SODIUM 40 MG/1
40 TABLET ORAL DAILY
Refills: 0 | Status: DISCONTINUED | COMMUNITY
End: 2022-02-03

## 2022-05-11 ENCOUNTER — RX RENEWAL (OUTPATIENT)
Age: 82
End: 2022-05-11

## 2022-07-30 ENCOUNTER — LABORATORY RESULT (OUTPATIENT)
Age: 82
End: 2022-07-30

## 2022-08-04 ENCOUNTER — APPOINTMENT (OUTPATIENT)
Dept: CARDIOLOGY | Facility: CLINIC | Age: 82
End: 2022-08-04

## 2022-08-04 VITALS
HEIGHT: 62 IN | BODY MASS INDEX: 26.31 KG/M2 | SYSTOLIC BLOOD PRESSURE: 130 MMHG | HEART RATE: 58 BPM | DIASTOLIC BLOOD PRESSURE: 90 MMHG | TEMPERATURE: 97.8 F | WEIGHT: 143 LBS

## 2022-08-04 PROCEDURE — 93000 ELECTROCARDIOGRAM COMPLETE: CPT

## 2022-08-04 PROCEDURE — 99214 OFFICE O/P EST MOD 30 MIN: CPT

## 2022-08-04 RX ORDER — NEOMYCIN SULFATE, POLYMYXIN B SULFATE AND DEXAMETHASONE 3.5; 10000; 1 MG/ML; [USP'U]/ML; MG/ML
3.5-10000-0.1 SUSPENSION OPHTHALMIC
Qty: 5 | Refills: 0 | Status: ACTIVE | COMMUNITY
Start: 2022-02-11

## 2022-08-22 ENCOUNTER — NON-APPOINTMENT (OUTPATIENT)
Age: 82
End: 2022-08-22

## 2022-09-22 ENCOUNTER — RX RENEWAL (OUTPATIENT)
Age: 82
End: 2022-09-22

## 2022-12-23 ENCOUNTER — RX RENEWAL (OUTPATIENT)
Age: 82
End: 2022-12-23

## 2023-02-13 ENCOUNTER — APPOINTMENT (OUTPATIENT)
Dept: ORTHOPEDIC SURGERY | Facility: CLINIC | Age: 83
End: 2023-02-13
Payer: MEDICARE

## 2023-02-13 VITALS — HEIGHT: 62 IN | BODY MASS INDEX: 26.68 KG/M2 | WEIGHT: 145 LBS

## 2023-02-13 DIAGNOSIS — M72.0 PALMAR FASCIAL FIBROMATOSIS [DUPUYTREN]: ICD-10-CM

## 2023-02-13 PROCEDURE — 99202 OFFICE O/P NEW SF 15 MIN: CPT

## 2023-02-13 NOTE — ASSESSMENT
[FreeTextEntry1] : Patient has Dupuytren's contracture in the left palm.  Natural history of this condition was discussed with the patient the surgical intervention for Dupuytren's nodule excision was discussed.  Going into a full cord was discussed.  Use of Xiaflex was discussed.  Again the natural history was also discussed.  No treatment is necessary at this time.  She will see us back on an as-needed basis.

## 2023-02-13 NOTE — HISTORY OF PRESENT ILLNESS
[de-identified] : 82-year-old female comes in the office for evaluation today regarding her left hand patient has noted a mass present in the left hand.  And she comes in for the evaluation mass does give her some pain and discomfort when it is touched.  No trauma to the area.  With use of the hand there sometimes can be more pain and discomfort.

## 2023-02-13 NOTE — PHYSICAL EXAM
[de-identified] : Patient is a mass present in the palm consistent with a Dupuytren's nodule is good range of motion of the fingers there is no contracture of the MP joint or PIP joint of the left hand no erythema ecchymoses or abrasions there is no Tinel's associated with the mass.  This is consistent with a Dupuytren's nodule present in the palm at the distal palmar crease over the ring finger

## 2023-02-18 ENCOUNTER — LABORATORY RESULT (OUTPATIENT)
Age: 83
End: 2023-02-18

## 2023-02-23 ENCOUNTER — APPOINTMENT (OUTPATIENT)
Dept: CARDIOLOGY | Facility: CLINIC | Age: 83
End: 2023-02-23
Payer: MEDICARE

## 2023-02-23 VITALS
HEIGHT: 62 IN | DIASTOLIC BLOOD PRESSURE: 70 MMHG | BODY MASS INDEX: 26.31 KG/M2 | WEIGHT: 143 LBS | SYSTOLIC BLOOD PRESSURE: 124 MMHG | HEART RATE: 62 BPM

## 2023-02-23 PROCEDURE — 99214 OFFICE O/P EST MOD 30 MIN: CPT

## 2023-02-23 PROCEDURE — 93000 ELECTROCARDIOGRAM COMPLETE: CPT

## 2023-02-23 NOTE — DISCUSSION/SUMMARY
[FreeTextEntry1] : Patient was instructed to target her T. Cholesterol to less than 200 mg/dl and LDL cholesterol to less than 100 mg/dl. Start a low fat, low cholesterol diet.\par Patient declined to change her statin or to increase the dose of pravastatin to 80 mg.\par Exercise and weight loss was advised.\par EKG: NSR, normal findings\par Maintain cardiac medications.  She will continue to monitor her BP at home which have been in the normal range.\par Patient was advised to repeat a BMP, CBC , fasting lipid profile and hepatic panel.\par RV in 3 months.\par

## 2023-04-04 ENCOUNTER — RX RENEWAL (OUTPATIENT)
Age: 83
End: 2023-04-04

## 2023-05-16 ENCOUNTER — RX RENEWAL (OUTPATIENT)
Age: 83
End: 2023-05-16

## 2023-05-22 ENCOUNTER — LABORATORY RESULT (OUTPATIENT)
Age: 83
End: 2023-05-22

## 2023-05-25 ENCOUNTER — APPOINTMENT (OUTPATIENT)
Dept: CARDIOLOGY | Facility: CLINIC | Age: 83
End: 2023-05-25
Payer: MEDICARE

## 2023-05-25 VITALS
SYSTOLIC BLOOD PRESSURE: 126 MMHG | DIASTOLIC BLOOD PRESSURE: 70 MMHG | BODY MASS INDEX: 26.31 KG/M2 | HEIGHT: 62 IN | WEIGHT: 143 LBS | HEART RATE: 64 BPM

## 2023-05-25 PROCEDURE — 99214 OFFICE O/P EST MOD 30 MIN: CPT

## 2023-05-25 PROCEDURE — 93000 ELECTROCARDIOGRAM COMPLETE: CPT

## 2023-05-25 NOTE — DISCUSSION/SUMMARY
[FreeTextEntry1] : Patient was instructed to target her T. Cholesterol to less than 200 mg/dl and LDL cholesterol to less than 100 mg/dl. Start a low fat, low cholesterol diet.\par Patient declined to change her statin or to increase the dose of pravastatin to 80 mg. She was able to improve her diet and to exercise more.\par Exercise and weight loss was advised.\par EKG: NSR, rate of 64 bpm normal findings\par Maintain cardiac medications.  She will continue to monitor her BP at home which have been in the normal range.\par Patient was advised to repeat a BMP, CBC , fasting lipid profile and hepatic panel.\par RV in 6 months.\par

## 2023-06-12 ENCOUNTER — RX RENEWAL (OUTPATIENT)
Age: 83
End: 2023-06-12

## 2023-07-25 ENCOUNTER — NON-APPOINTMENT (OUTPATIENT)
Age: 83
End: 2023-07-25

## 2023-11-27 ENCOUNTER — LABORATORY RESULT (OUTPATIENT)
Age: 83
End: 2023-11-27

## 2023-11-30 ENCOUNTER — APPOINTMENT (OUTPATIENT)
Dept: CARDIOLOGY | Facility: CLINIC | Age: 83
End: 2023-11-30
Payer: MEDICARE

## 2023-11-30 VITALS
SYSTOLIC BLOOD PRESSURE: 140 MMHG | DIASTOLIC BLOOD PRESSURE: 90 MMHG | WEIGHT: 144 LBS | BODY MASS INDEX: 26.5 KG/M2 | HEART RATE: 60 BPM | HEIGHT: 62 IN

## 2023-11-30 PROCEDURE — 99214 OFFICE O/P EST MOD 30 MIN: CPT

## 2023-11-30 PROCEDURE — 93000 ELECTROCARDIOGRAM COMPLETE: CPT

## 2024-02-05 NOTE — PROGRESS NOTE ADULT - PROVIDER SPECIALTY LIST ADULT
Problem: Hemodialysis  Goal: Dialysis: Safe, effective, and comfortable hemodialysis treatment (Hemodialysis nurse only)  Outcome: Monitoring/Evaluating progress  Note: Patient tolerated treatment met goals ran 3 hours removed 1 kg BFR    350   Goal: Dialysis: Free of complications related to initiation/termination of dialysis (Hemodialysis nurse only)  Outcome: Monitoring/Evaluating progress  Note: No issues with access, catheter secured with sodium citrate and 2 red caps      Physiatry

## 2024-03-08 ENCOUNTER — RX RENEWAL (OUTPATIENT)
Age: 84
End: 2024-03-08

## 2024-03-08 RX ORDER — PRAVASTATIN SODIUM 40 MG/1
40 TABLET ORAL
Qty: 90 | Refills: 3 | Status: ACTIVE | COMMUNITY
Start: 2021-01-04 | End: 1900-01-01

## 2024-04-17 ENCOUNTER — LABORATORY RESULT (OUTPATIENT)
Age: 84
End: 2024-04-17

## 2024-04-22 ENCOUNTER — APPOINTMENT (OUTPATIENT)
Dept: CARDIOLOGY | Facility: CLINIC | Age: 84
End: 2024-04-22
Payer: MEDICARE

## 2024-04-22 VITALS
DIASTOLIC BLOOD PRESSURE: 74 MMHG | HEIGHT: 62 IN | SYSTOLIC BLOOD PRESSURE: 122 MMHG | HEART RATE: 61 BPM | BODY MASS INDEX: 25.4 KG/M2 | WEIGHT: 138 LBS

## 2024-04-22 DIAGNOSIS — I11.9 HYPERTENSIVE HEART DISEASE W/OUT HEART FAILURE: ICD-10-CM

## 2024-04-22 DIAGNOSIS — E78.5 HYPERLIPIDEMIA, UNSPECIFIED: ICD-10-CM

## 2024-04-22 PROCEDURE — 99214 OFFICE O/P EST MOD 30 MIN: CPT

## 2024-04-22 PROCEDURE — 93000 ELECTROCARDIOGRAM COMPLETE: CPT

## 2024-04-22 NOTE — DISCUSSION/SUMMARY
[FreeTextEntry1] : Patient was instructed to target her T. Cholesterol to less than 200 mg/dl and LDL cholesterol to less than 100 mg/dl. Start a low fat, low cholesterol diet. Patient declined to change her statin or to increase the dose of pravastatin to 80 mg. She is advised to improve her diet and to exercise more. Exercise and weight loss was advised. EKG: NSR, rate of 61 bpm normal findings Maintain cardiac medications.  She will continue to monitor her BP at home which have been in the normal range. Patient was advised to repeat a BMP, CBC , fasting lipid profile and hepatic panel and a HgA1c RV in 9/2024  [EKG obtained to assist in diagnosis and management of assessed problem(s)] : EKG obtained to assist in diagnosis and management of assessed problem(s)

## 2024-05-20 ENCOUNTER — RX RENEWAL (OUTPATIENT)
Age: 84
End: 2024-05-20

## 2024-05-20 RX ORDER — FOLIC ACID 1 MG/1
1 TABLET ORAL
Qty: 90 | Refills: 3 | Status: ACTIVE | COMMUNITY
Start: 2020-09-03 | End: 1900-01-01

## 2024-06-08 ENCOUNTER — RX RENEWAL (OUTPATIENT)
Age: 84
End: 2024-06-08

## 2024-06-08 RX ORDER — IRBESARTAN 150 MG/1
150 TABLET ORAL
Qty: 90 | Refills: 3 | Status: ACTIVE | COMMUNITY
Start: 2021-01-04 | End: 1900-01-01

## 2024-06-29 ENCOUNTER — RX RENEWAL (OUTPATIENT)
Age: 84
End: 2024-06-29

## 2024-09-23 ENCOUNTER — LABORATORY RESULT (OUTPATIENT)
Age: 84
End: 2024-09-23

## 2024-09-26 ENCOUNTER — APPOINTMENT (OUTPATIENT)
Dept: CARDIOLOGY | Facility: CLINIC | Age: 84
End: 2024-09-26
Payer: MEDICARE

## 2024-09-26 VITALS
SYSTOLIC BLOOD PRESSURE: 122 MMHG | DIASTOLIC BLOOD PRESSURE: 80 MMHG | HEART RATE: 63 BPM | BODY MASS INDEX: 25.95 KG/M2 | HEIGHT: 62 IN | WEIGHT: 141 LBS

## 2024-09-26 DIAGNOSIS — I11.9 HYPERTENSIVE HEART DISEASE W/OUT HEART FAILURE: ICD-10-CM

## 2024-09-26 DIAGNOSIS — E78.5 HYPERLIPIDEMIA, UNSPECIFIED: ICD-10-CM

## 2024-09-26 PROCEDURE — 93000 ELECTROCARDIOGRAM COMPLETE: CPT

## 2024-09-26 PROCEDURE — 99214 OFFICE O/P EST MOD 30 MIN: CPT

## 2024-09-26 RX ORDER — PRAVASTATIN SODIUM 80 MG/1
80 TABLET ORAL
Qty: 90 | Refills: 3 | Status: ACTIVE | COMMUNITY
Start: 2024-09-26 | End: 1900-01-01

## 2024-09-26 NOTE — DISCUSSION/SUMMARY
[FreeTextEntry1] : Patient was instructed to target her T. Cholesterol to less than 200 mg/dl and LDL cholesterol to less than 100 mg/dl. Start a low fat, low cholesterol diet. Patient declined to change her statin or to increase the dose of pravastatin to 80 mg previously.  She is now agreeable to take the 80 mg of pravastatin.She is advised to improve her diet and to exercise more. Exercise and weight loss was advised. EKG: NSR, rate of 63 bpm normal findings Maintain cardiac medications.  She will continue to monitor her BP at home which have been in the normal range. Patient was advised to repeat a BMP, CBC , fasting lipid profile and hepatic panel and a HgA1c RV in 6 months.  [EKG obtained to assist in diagnosis and management of assessed problem(s)] : EKG obtained to assist in diagnosis and management of assessed problem(s)

## 2024-09-26 NOTE — REASON FOR VISIT
[FreeTextEntry1] : Patient presents for Cardiology follow up and review of her lab results. She has known HTN, ASHD, hyperlipidemia, and a prior TIA.

## 2024-10-24 NOTE — ED ADULT TRIAGE NOTE - SPO2 (%)
Patient coming in tomorrow for thyroid labs, she is also wanting to have orders put in for fasting labs. She states she was supposed to have these checked in 6 months due to her previous results. She will come fasting to that lab appt.   
94

## 2025-03-21 ENCOUNTER — LABORATORY RESULT (OUTPATIENT)
Age: 85
End: 2025-03-21

## 2025-03-27 ENCOUNTER — NON-APPOINTMENT (OUTPATIENT)
Age: 85
End: 2025-03-27

## 2025-03-27 ENCOUNTER — APPOINTMENT (OUTPATIENT)
Dept: CARDIOLOGY | Facility: CLINIC | Age: 85
End: 2025-03-27
Payer: MEDICARE

## 2025-03-27 VITALS
SYSTOLIC BLOOD PRESSURE: 134 MMHG | WEIGHT: 140 LBS | HEART RATE: 68 BPM | DIASTOLIC BLOOD PRESSURE: 78 MMHG | BODY MASS INDEX: 25.76 KG/M2 | HEIGHT: 62 IN

## 2025-03-27 DIAGNOSIS — I11.9 HYPERTENSIVE HEART DISEASE W/OUT HEART FAILURE: ICD-10-CM

## 2025-03-27 DIAGNOSIS — E78.5 HYPERLIPIDEMIA, UNSPECIFIED: ICD-10-CM

## 2025-03-27 PROCEDURE — 93000 ELECTROCARDIOGRAM COMPLETE: CPT

## 2025-03-27 PROCEDURE — 99214 OFFICE O/P EST MOD 30 MIN: CPT

## 2025-03-31 ENCOUNTER — APPOINTMENT (OUTPATIENT)
Dept: ORTHOPEDIC SURGERY | Facility: CLINIC | Age: 85
End: 2025-03-31

## 2025-03-31 DIAGNOSIS — M65.312 TRIGGER THUMB, LEFT THUMB: ICD-10-CM

## 2025-03-31 DIAGNOSIS — M72.0 PALMAR FASCIAL FIBROMATOSIS [DUPUYTREN]: ICD-10-CM

## 2025-03-31 PROCEDURE — 99214 OFFICE O/P EST MOD 30 MIN: CPT

## 2025-05-23 ENCOUNTER — RX RENEWAL (OUTPATIENT)
Age: 85
End: 2025-05-23

## 2025-08-27 ENCOUNTER — RX RENEWAL (OUTPATIENT)
Age: 85
End: 2025-08-27

## 2025-09-13 ENCOUNTER — RX RENEWAL (OUTPATIENT)
Age: 85
End: 2025-09-13